# Patient Record
Sex: FEMALE | Race: WHITE | NOT HISPANIC OR LATINO | Employment: OTHER | URBAN - NONMETROPOLITAN AREA
[De-identification: names, ages, dates, MRNs, and addresses within clinical notes are randomized per-mention and may not be internally consistent; named-entity substitution may affect disease eponyms.]

---

## 2017-03-29 PROBLEM — I10 HYPERTENSION: Status: ACTIVE | Noted: 2017-03-29

## 2017-04-07 RX ORDER — QUINAPRIL 40 MG/1
80 TABLET ORAL DAILY
Qty: 180 TABLET | Refills: 3 | Status: SHIPPED | OUTPATIENT
Start: 2017-04-07 | End: 2018-01-04

## 2017-04-10 ENCOUNTER — OFFICE VISIT (OUTPATIENT)
Dept: INTERNAL MEDICINE | Facility: CLINIC | Age: 57
End: 2017-04-10

## 2017-04-10 VITALS
TEMPERATURE: 98.6 F | SYSTOLIC BLOOD PRESSURE: 115 MMHG | HEART RATE: 71 BPM | HEIGHT: 63 IN | WEIGHT: 146.5 LBS | OXYGEN SATURATION: 95 % | BODY MASS INDEX: 25.96 KG/M2 | DIASTOLIC BLOOD PRESSURE: 70 MMHG

## 2017-04-10 DIAGNOSIS — Z00.00 ROUTINE GENERAL MEDICAL EXAMINATION AT A HEALTH CARE FACILITY: ICD-10-CM

## 2017-04-10 DIAGNOSIS — I10 ESSENTIAL HYPERTENSION: Primary | ICD-10-CM

## 2017-04-10 DIAGNOSIS — Z12.11 SCREENING FOR COLON CANCER: ICD-10-CM

## 2017-04-10 PROCEDURE — 99396 PREV VISIT EST AGE 40-64: CPT | Performed by: INTERNAL MEDICINE

## 2017-04-10 RX ORDER — CETIRIZINE HYDROCHLORIDE 10 MG/1
10 TABLET ORAL DAILY
COMMUNITY

## 2017-04-10 NOTE — PROGRESS NOTES
Chief Complaint   Patient presents with   • Follow-up     1 year follow up       Evelyne Cantu is a 56 y.o. female and is here for a comprehensive physical exam. The patient reports problems - wt. gain.     History:  LMP: No LMP recorded.  Menopause at 46 years  Last pap date:   Abnormal pap? no  : 2  Para: 2    Do you take any herbs or supplements that were not prescribed by a doctor? no  Are you taking calcium supplements? yes  Are you taking aspirin daily? no      Health Habits:  Dental Exam. up to date  Eye Exam. up to date  Exercise: 1 times/week.  Current exercise activities include: weightlifting    Health Maintenance   Topic Date Due   • TDAP/TD VACCINES (1 - Tdap) 1979   • HEPATITIS C SCREENING  2017   • COLONOSCOPY  2017   • PAP SMEAR  2017   • INFLUENZA VACCINE  Completed       PMH, PSH, SocHx, FamHx, Allergies, and Medications: Reviewed and updated in the Visit Navigator.     No Known Allergies  Past Medical History:   Diagnosis Date   • Vitamin D deficiency      Past Surgical History:   Procedure Laterality Date   • ANKLE SURGERY     • HYSTERECTOMY       Social History     Social History   • Marital status:      Spouse name: N/A   • Number of children: N/A   • Years of education: N/A     Occupational History   • Not on file.     Social History Main Topics   • Smoking status: Never Smoker   • Smokeless tobacco: Not on file   • Alcohol use Not on file   • Drug use: Not on file   • Sexual activity: Not on file     Other Topics Concern   • Not on file     Social History Narrative   • No narrative on file     Family History   Problem Relation Age of Onset   • Arthritis Mother    • Cancer Mother    • Hypertension Mother    • Hypertension Father        Review of Systems  Review of Systems   Constitutional: Positive for fatigue. Negative for activity change, appetite change and fever.   HENT: Negative for congestion, ear discharge, ear pain and trouble swallowing.   "  Eyes: Negative for photophobia and visual disturbance.   Respiratory: Negative for cough and shortness of breath.    Cardiovascular: Negative for chest pain and palpitations.   Gastrointestinal: Negative for abdominal distention, abdominal pain, constipation, diarrhea, nausea and vomiting.   Endocrine: Negative.    Genitourinary: Negative for dysuria, hematuria and urgency.   Musculoskeletal: Positive for arthralgias. Negative for back pain, joint swelling and myalgias.   Skin: Negative for color change and rash.   Allergic/Immunologic: Negative.    Neurological: Negative for dizziness, weakness, light-headedness and headaches.   Hematological: Negative for adenopathy. Does not bruise/bleed easily.   Psychiatric/Behavioral: Negative for agitation, confusion and dysphoric mood. The patient is not nervous/anxious.        Vitals:    04/10/17 1616   BP: 115/70   Pulse: 71   Temp: 98.6 °F (37 °C)   SpO2: 95%       Objective   /70  Pulse 71  Temp 98.6 °F (37 °C)  Ht 63\" (160 cm)  Wt 146 lb 8 oz (66.5 kg)  SpO2 95%  BMI 25.95 kg/m2    Physical Exam   Constitutional: She is oriented to person, place, and time. She appears well-developed and well-nourished. No distress.   HENT:   Nose: Nose normal.   Mouth/Throat: Oropharynx is clear and moist.   Eyes: Conjunctivae and EOM are normal. No scleral icterus.   Neck: No tracheal deviation present. No thyromegaly present.   Cardiovascular: Normal rate and regular rhythm.  Exam reveals no friction rub.    No murmur heard.  Pulmonary/Chest: No respiratory distress. She has no wheezes. She has no rales.   Abdominal: Soft. She exhibits no distension and no mass. There is no tenderness. There is no guarding.   Musculoskeletal: Normal range of motion. She exhibits no deformity.   Lymphadenopathy:     She has no cervical adenopathy.   Neurological: She is alert and oriented to person, place, and time. She has normal reflexes. No cranial nerve deficit. Coordination normal. "   Skin: Skin is warm and dry. No rash noted. No erythema.   Psychiatric: She has a normal mood and affect. Her behavior is normal. Judgment and thought content normal.        Assessment/Plan   1. Healthy female exam.  2. Patient Counseling: Including but not Limited to the following, when appropriate:  --Nutrition: Stressed importance of moderation in sodium/caffeine intake, saturated fat and cholesterol, caloric balance, sufficient intake of fresh fruits, vegetables, fiber, calcium, iron,     --Discussed the issue of estrogen replacement, calcium supplement    --Exercise: Stressed the importance of regular exercise.     --Substance Abuse: Discussed cessation/primary prevention of tobacco, alcohol, or other drug use; driving or other dangerous activities under the influence; availability of treatment for abuse, as indicated based on social history.      --Sexuality: Discussed sexually transmitted diseases, partner selection, use of condoms, avoidance of unintended pregnancy  and contraceptive alternatives.     --Injury prevention: Discussed safety belts, safety helmets, smoke detector, smoking near bedding or upholstery.     --Dental health: Discussed importance of regular tooth brushing, flossing, and dental visits.  --Immunizations reviewed.  --Discussed benefits of colon cancer screening.      3. Discussed the patient's BMI with her.  The BMI is in the acceptable range  4. No Follow-up on file.  5. Age-appropriate Screening Scheduled  6. There are no Patient Instructions on file for this visit.    Assessment/Plan     Evelyne was seen today for follow-up.    Diagnoses and all orders for this visit:    Essential hypertension

## 2017-04-17 RX ORDER — AMLODIPINE BESYLATE 2.5 MG/1
2.5 TABLET ORAL DAILY
Qty: 90 TABLET | Refills: 3 | Status: SHIPPED | OUTPATIENT
Start: 2017-04-17 | End: 2018-01-12

## 2017-04-17 RX ORDER — AMLODIPINE BESYLATE 2.5 MG/1
2.5 TABLET ORAL DAILY
Qty: 90 TABLET | Refills: 3 | Status: SHIPPED | OUTPATIENT
Start: 2017-04-17 | End: 2017-04-17 | Stop reason: SDUPTHER

## 2017-04-18 RX ORDER — AMLODIPINE BESYLATE 5 MG/1
5 TABLET ORAL DAILY
Qty: 90 TABLET | Refills: 3 | Status: SHIPPED | OUTPATIENT
Start: 2017-04-18 | End: 2018-01-12

## 2017-05-08 ENCOUNTER — TRANSCRIBE ORDERS (OUTPATIENT)
Dept: ADMINISTRATIVE | Facility: HOSPITAL | Age: 57
End: 2017-05-08

## 2017-05-08 DIAGNOSIS — Z13.9 SCREENING: Primary | ICD-10-CM

## 2017-05-10 LAB
DEVELOPER EXPIRATION DATE: NORMAL
DEVELOPER LOT NUMBER: NORMAL
EXPIRATION DATE: NORMAL
FECAL OCCULT BLOOD SCREEN, POC: NEGATIVE
Lab: NORMAL
NEGATIVE CONTROL: NEGATIVE
POSITIVE CONTROL: POSITIVE

## 2017-05-10 PROCEDURE — 82270 OCCULT BLOOD FECES: CPT | Performed by: INTERNAL MEDICINE

## 2017-05-12 ENCOUNTER — TELEPHONE (OUTPATIENT)
Dept: INTERNAL MEDICINE | Facility: CLINIC | Age: 57
End: 2017-05-12

## 2017-05-16 ENCOUNTER — HOSPITAL ENCOUNTER (OUTPATIENT)
Dept: MAMMOGRAPHY | Facility: HOSPITAL | Age: 57
Discharge: HOME | End: 2017-05-16
Admitting: OBSTETRICS & GYNECOLOGY

## 2017-05-16 DIAGNOSIS — Z13.9 SCREENING: ICD-10-CM

## 2017-05-16 PROCEDURE — G0202 SCR MAMMO BI INCL CAD: HCPCS

## 2017-05-16 PROCEDURE — 77063 BREAST TOMOSYNTHESIS BI: CPT

## 2018-01-04 RX ORDER — QUINAPRIL 40 MG/1
80 TABLET ORAL DAILY
Qty: 180 TABLET | Refills: 0 | Status: SHIPPED | OUTPATIENT
Start: 2018-01-04 | End: 2018-04-09 | Stop reason: SDUPTHER

## 2018-01-12 RX ORDER — AMLODIPINE BESYLATE 5 MG/1
5 TABLET ORAL DAILY
Qty: 90 TABLET | Refills: 3 | Status: SHIPPED | OUTPATIENT
Start: 2018-01-12 | End: 2018-04-09 | Stop reason: SDUPTHER

## 2018-01-12 RX ORDER — AMLODIPINE BESYLATE 2.5 MG/1
2.5 TABLET ORAL DAILY
Qty: 90 TABLET | Refills: 3 | Status: SHIPPED | OUTPATIENT
Start: 2018-01-12 | End: 2018-04-09 | Stop reason: SDUPTHER

## 2018-04-09 ENCOUNTER — OFFICE VISIT (OUTPATIENT)
Dept: INTERNAL MEDICINE | Facility: CLINIC | Age: 58
End: 2018-04-09

## 2018-04-09 VITALS
OXYGEN SATURATION: 98 % | HEART RATE: 68 BPM | HEIGHT: 63 IN | DIASTOLIC BLOOD PRESSURE: 80 MMHG | BODY MASS INDEX: 24.98 KG/M2 | WEIGHT: 141 LBS | TEMPERATURE: 98.3 F | SYSTOLIC BLOOD PRESSURE: 120 MMHG

## 2018-04-09 DIAGNOSIS — Z00.00 ROUTINE GENERAL MEDICAL EXAMINATION AT A HEALTH CARE FACILITY: Primary | ICD-10-CM

## 2018-04-09 PROCEDURE — 99396 PREV VISIT EST AGE 40-64: CPT | Performed by: INTERNAL MEDICINE

## 2018-04-09 RX ORDER — QUINAPRIL 40 MG/1
80 TABLET ORAL DAILY
Qty: 180 TABLET | Refills: 3 | Status: SHIPPED | OUTPATIENT
Start: 2018-04-09 | End: 2019-04-11

## 2018-04-09 RX ORDER — AMLODIPINE BESYLATE 2.5 MG/1
2.5 TABLET ORAL DAILY
Qty: 90 TABLET | Refills: 3 | Status: SHIPPED | OUTPATIENT
Start: 2018-04-09 | End: 2019-04-11

## 2018-04-09 RX ORDER — AMLODIPINE BESYLATE 5 MG/1
5 TABLET ORAL DAILY
Qty: 90 TABLET | Refills: 3 | Status: SHIPPED | OUTPATIENT
Start: 2018-04-09 | End: 2019-04-19 | Stop reason: SDUPTHER

## 2018-04-09 NOTE — PROGRESS NOTES
Chief Complaint   Patient presents with   • Hypertension       Evelyne Cantu is a 57 y.o. female and is here for a comprehensive physical exam. The patient reports no problems   .     History:  LMP: No LMP recorded.  Menopause at 40 yrs  Last pap date: 40 yrs  Abnormal pap? no  : 2  Para: 2    Do you take any herbs or supplements that were not prescribed by a doctor? no  Are you taking calcium supplements? yes  Are you taking aspirin daily? no      Health Habits:  Dental Exam. up to date  Eye Exam. up to date  Exercise: 5 times/week.  Current exercise activities include: walking and yoga    Health Maintenance   Topic Date Due   • TDAP/TD VACCINES (1 - Tdap) 1979   • HEPATITIS C SCREENING  2017   • COLONOSCOPY  2017   • INFLUENZA VACCINE  2017   • COLON CANCER SCREENING ANNUAL FOBT  05/10/2018   • MAMMOGRAM  2019       PMH, PSH, SocHx, FamHx, Allergies, and Medications: Reviewed and updated in the Visit Navigator.     No Known Allergies  Past Medical History:   Diagnosis Date   • Vitamin D deficiency      Past Surgical History:   Procedure Laterality Date   • ANKLE SURGERY     • HYSTERECTOMY       Social History     Social History   • Marital status:      Spouse name: N/A   • Number of children: N/A   • Years of education: N/A     Occupational History   • Not on file.     Social History Main Topics   • Smoking status: Never Smoker   • Smokeless tobacco: Never Used   • Alcohol use 0.6 oz/week     1 Glasses of wine per week   • Drug use: No   • Sexual activity: Defer     Other Topics Concern   • Not on file     Social History Narrative   • No narrative on file     Family History   Problem Relation Age of Onset   • Arthritis Mother    • Cancer Mother    • Hypertension Mother    • Hypertension Father        Review of Systems  Review of Systems   Constitutional: Negative.  Negative for activity change, appetite change, fatigue and fever.   HENT: Negative for congestion, ear  "discharge, ear pain and trouble swallowing.         Vertigo   Eyes: Negative for photophobia and visual disturbance.   Respiratory: Negative for cough and shortness of breath.    Cardiovascular: Negative for chest pain and palpitations.   Gastrointestinal: Negative for abdominal distention, abdominal pain, constipation, diarrhea, nausea and vomiting.   Endocrine: Negative.    Genitourinary: Negative for dysuria, hematuria and urgency.   Musculoskeletal: Negative for arthralgias, back pain, joint swelling and myalgias.   Skin: Negative for color change and rash.   Allergic/Immunologic: Negative.    Neurological: Negative for dizziness, weakness, light-headedness and headaches.   Hematological: Negative for adenopathy. Does not bruise/bleed easily.   Psychiatric/Behavioral: Negative for agitation, confusion and dysphoric mood. The patient is not nervous/anxious.        Vitals:    04/09/18 1524   BP: 120/80   Pulse: 68   Temp: 98.3 °F (36.8 °C)   SpO2: 98%       Objective   /80   Pulse 68   Temp 98.3 °F (36.8 °C)   Ht 160 cm (63\")   Wt 64 kg (141 lb)   SpO2 98%   BMI 24.98 kg/m²     Physical Exam   Constitutional: She is oriented to person, place, and time. She appears well-developed and well-nourished. No distress.   HENT:   Nose: Nose normal.   Mouth/Throat: Oropharynx is clear and moist.   Eyes: Conjunctivae and EOM are normal. No scleral icterus.   Neck: No tracheal deviation present. No thyromegaly present.   Cardiovascular: Normal rate and regular rhythm.  Exam reveals no friction rub.    No murmur heard.  Pulmonary/Chest: No respiratory distress. She has no wheezes. She has no rales.   Abdominal: Soft. She exhibits no distension and no mass. There is no tenderness. There is no guarding.   Musculoskeletal: Normal range of motion. She exhibits no deformity.   Lymphadenopathy:     She has no cervical adenopathy.   Neurological: She is alert and oriented to person, place, and time. She has normal " reflexes. No cranial nerve deficit. Coordination normal.   Skin: Skin is warm and dry. No rash noted. No erythema.   Psychiatric: She has a normal mood and affect. Her behavior is normal. Judgment and thought content normal.        Assessment/Plan   1. Healthy female exam.   2. Patient Counseling: Including but not Limited to the following, when appropriate:  --Nutrition: Stressed importance of moderation in sodium/caffeine intake, saturated fat and cholesterol, caloric balance, sufficient intake of fresh fruits, vegetables, fiber, calcium  --Discussed the issue of  calcium supplement  --Exercise: Stressed the importance of regular exercise.   --Substance Abuse: Discussed cessation/primary prevention of tobacco, alcohol, or other drug use; driving or other dangerous activities under the influence; availability of treatment for abuse, as indicated based on social history.    --Sexuality: Discussed sexually transmitted diseases, partner selection, use of condoms, avoidance of unintended pregnancy  and contraceptive alternatives.   --Injury prevention: Discussed safety belts, safety helmets, smoke detector, smoking near bedding or upholstery.   --Dental health: Discussed importance of regular tooth brushing, flossing, and dental visits.  --Immunizations reviewed.  --Discussed benefits of colon cancer screening.      3. Discussed the patient's BMI with her.  The BMI is in the acceptable range  4. No Follow-up on file.  5. Age-appropriate Screening Scheduled  6. There are no Patient Instructions on file for this visit.    Assessment/Plan     Evelyne was seen today for hypertension.    Diagnoses and all orders for this visit:    Routine general medical examination at a health care facility    Other orders  -     amLODIPine (NORVASC) 2.5 MG tablet; TAKE 1 TABLET BY MOUTH ONCE DAILY  -     amLODIPine (NORVASC) 5 MG tablet; Take 1 tablet by mouth Daily.  -     quinapril (ACCUPRIL) 40 MG tablet; TAKE 2 TABLETS BY MOUTH ONCE  DAILY  -     conjugated estrogens (PREMARIN) 0.625 MG/GM vaginal cream; Insert 1 applicatorful intravaginally as directed

## 2018-04-11 ENCOUNTER — TRANSCRIBE ORDERS (OUTPATIENT)
Dept: ADMINISTRATIVE | Facility: HOSPITAL | Age: 58
End: 2018-04-11

## 2018-04-11 DIAGNOSIS — Z12.39 SCREENING BREAST EXAMINATION: Primary | ICD-10-CM

## 2018-05-21 ENCOUNTER — APPOINTMENT (OUTPATIENT)
Dept: MAMMOGRAPHY | Facility: HOSPITAL | Age: 58
End: 2018-05-21

## 2018-06-26 ENCOUNTER — HOSPITAL ENCOUNTER (OUTPATIENT)
Dept: MAMMOGRAPHY | Facility: HOSPITAL | Age: 58
Discharge: HOME | End: 2018-06-26
Admitting: INTERNAL MEDICINE

## 2018-06-26 DIAGNOSIS — Z12.39 SCREENING BREAST EXAMINATION: ICD-10-CM

## 2018-06-26 PROCEDURE — 77063 BREAST TOMOSYNTHESIS BI: CPT

## 2018-06-26 PROCEDURE — 77067 SCR MAMMO BI INCL CAD: CPT

## 2018-08-01 ENCOUNTER — OFFICE VISIT (OUTPATIENT)
Dept: INTERNAL MEDICINE | Facility: CLINIC | Age: 58
End: 2018-08-01

## 2018-08-01 VITALS
HEART RATE: 60 BPM | TEMPERATURE: 97.6 F | SYSTOLIC BLOOD PRESSURE: 119 MMHG | DIASTOLIC BLOOD PRESSURE: 68 MMHG | WEIGHT: 145 LBS | HEIGHT: 63 IN | BODY MASS INDEX: 25.69 KG/M2 | OXYGEN SATURATION: 99 %

## 2018-08-01 DIAGNOSIS — K11.5 STONE OF SALIVARY GLAND OR DUCT: Primary | ICD-10-CM

## 2018-08-01 PROCEDURE — 99213 OFFICE O/P EST LOW 20 MIN: CPT | Performed by: PHYSICIAN ASSISTANT

## 2018-08-01 NOTE — PROGRESS NOTES
Subjective     Chief Complaint: jaw swelling    History of Present Illness     Evelyne Cantu is a 58 y.o. female presenting with complaints of 2 days swelling to her left jaw.  Patient states she took ibuprofen, massage the area, and the swelling somewhat improved.  Denies any symptoms of infection, no sore throat, ear pain, fever, cough.  Denies any history of TMJ. No current dental issues.    The following portions of the patient's history were reviewed and updated as appropriate: current medications, allergies, PMH.    Review of Systems   Constitutional: Negative for appetite change, chills, fatigue, fever and unexpected weight change.   HENT: Positive for facial swelling. Negative for congestion, ear pain, hearing loss, nosebleeds, sinus pressure, sore throat, tinnitus and trouble swallowing.    Eyes: Negative for pain, discharge, redness, itching and visual disturbance.   Respiratory: Negative for cough, chest tightness, shortness of breath and wheezing.    Cardiovascular: Negative for chest pain, palpitations and leg swelling.   Gastrointestinal: Negative for abdominal pain, blood in stool, constipation, diarrhea, nausea and vomiting.   Endocrine: Negative for cold intolerance, heat intolerance, polydipsia, polyphagia and polyuria.   Genitourinary: Negative for decreased urine volume, dysuria, flank pain, frequency and hematuria.   Musculoskeletal: Negative for arthralgias, back pain, gait problem, joint swelling, myalgias, neck pain and neck stiffness.   Skin: Negative for color change and rash.   Allergic/Immunologic: Negative for environmental allergies, food allergies and immunocompromised state.   Neurological: Negative for dizziness, syncope, weakness, light-headedness and headaches.   Hematological: Negative for adenopathy. Does not bruise/bleed easily.   Psychiatric/Behavioral: Negative for dysphoric mood, sleep disturbance and suicidal ideas. The patient is not nervous/anxious.      Objective  "    Vitals:    08/01/18 0813   BP: 119/68   Pulse: 60   Temp: 97.6 °F (36.4 °C)   SpO2: 99%   Weight: 65.8 kg (145 lb)   Height: 160 cm (62.99\")     Physical Exam   Constitutional: She appears well-developed and well-nourished.   HENT:   Head:       Right Ear: Tympanic membrane normal.   Left Ear: Tympanic membrane normal.   Nose: Nose normal.   Mouth/Throat: Oropharynx is clear and moist.   Slight swelling to left jaw. No erythema. No lymphadenopathy.   Eyes: Pupils are equal, round, and reactive to light. Conjunctivae and EOM are normal.   Cardiovascular: Normal rate and regular rhythm.    Pulmonary/Chest: Effort normal and breath sounds normal.   Lymphadenopathy:     She has no cervical adenopathy.   Psychiatric: She has a normal mood and affect.     Assessment/Plan     Diagnoses and all orders for this visit:    Stone of salivary gland or duct    Improving.  She will continue with anti-inflammatory medications, sour candies, massage.      Bernarda Auguste PA-C  08/01/2018         Please note that portions of this note were completed with a voice recognition program. Efforts were made to edit dictation, but occasionally words are mistranscribed.    "

## 2018-08-27 ENCOUNTER — TELEPHONE (OUTPATIENT)
Dept: INTERNAL MEDICINE | Facility: CLINIC | Age: 58
End: 2018-08-27

## 2018-08-27 DIAGNOSIS — Z23 NEED FOR VACCINATION: Primary | ICD-10-CM

## 2019-04-11 RX ORDER — QUINAPRIL 40 MG/1
80 TABLET ORAL DAILY
Qty: 180 TABLET | Refills: 0 | Status: SHIPPED | OUTPATIENT
Start: 2019-04-11 | End: 2019-04-19 | Stop reason: SDUPTHER

## 2019-04-11 RX ORDER — AMLODIPINE BESYLATE 2.5 MG/1
2.5 TABLET ORAL DAILY
Qty: 90 TABLET | Refills: 0 | Status: SHIPPED | OUTPATIENT
Start: 2019-04-11 | End: 2019-04-19 | Stop reason: SDUPTHER

## 2019-04-19 ENCOUNTER — OFFICE VISIT (OUTPATIENT)
Dept: INTERNAL MEDICINE | Facility: CLINIC | Age: 59
End: 2019-04-19

## 2019-04-19 VITALS
HEIGHT: 63 IN | DIASTOLIC BLOOD PRESSURE: 87 MMHG | BODY MASS INDEX: 25.52 KG/M2 | HEART RATE: 69 BPM | WEIGHT: 144 LBS | SYSTOLIC BLOOD PRESSURE: 155 MMHG | TEMPERATURE: 98.9 F | OXYGEN SATURATION: 98 %

## 2019-04-19 DIAGNOSIS — Z00.00 ROUTINE GENERAL MEDICAL EXAMINATION AT A HEALTH CARE FACILITY: Primary | ICD-10-CM

## 2019-04-19 PROCEDURE — 99396 PREV VISIT EST AGE 40-64: CPT | Performed by: INTERNAL MEDICINE

## 2019-04-19 RX ORDER — QUINAPRIL 40 MG/1
80 TABLET ORAL DAILY
Qty: 180 TABLET | Refills: 0 | Status: SHIPPED | OUTPATIENT
Start: 2019-04-19 | End: 2019-09-09 | Stop reason: SDUPTHER

## 2019-04-19 RX ORDER — AMLODIPINE BESYLATE 5 MG/1
5 TABLET ORAL DAILY
Qty: 90 TABLET | Refills: 3 | Status: SHIPPED | OUTPATIENT
Start: 2019-04-19 | End: 2020-03-26 | Stop reason: SDUPTHER

## 2019-04-19 RX ORDER — AMLODIPINE BESYLATE 2.5 MG/1
2.5 TABLET ORAL DAILY
Qty: 90 TABLET | Refills: 3 | Status: SHIPPED | OUTPATIENT
Start: 2019-04-19 | End: 2020-06-05

## 2019-04-19 NOTE — PROGRESS NOTES
Chief Complaint   Patient presents with   • Hypertension       Evelyne Cantu is a 58 y.o. female and is here for a comprehensive physical exam. The patient reports no problems   .     History:  LMP: No LMP recorded (lmp unknown). Patient has had a hysterectomy.  Menopause at 40 yrs  Last pap date: 40 yrs  Abnormal pap? no  : 2  Para: 2    Do you take any herbs or supplements that were not prescribed by a doctor? no  Are you taking calcium supplements? yes  Are you taking aspirin daily? no      Health Habits:  Dental Exam. up to date  Eye Exam. up to date  Exercise: 5 times/week.  Current exercise activities include: walking and yoga    Health Maintenance   Topic Date Due   • TDAP/TD VACCINES (1 - Tdap) 1979   • ZOSTER VACCINE (1 of 2) 2010   • HEPATITIS C SCREENING  2017   • FECAL OCCULT BLOOD TEST  05/10/2018   • ANNUAL PHYSICAL  04/10/2019   • INFLUENZA VACCINE  2019   • MAMMOGRAM  2020       PMH, PSH, SocHx, FamHx, Allergies, and Medications: Reviewed and updated in the Visit Navigator.     No Known Allergies  Past Medical History:   Diagnosis Date   • Vitamin D deficiency      Past Surgical History:   Procedure Laterality Date   • ANKLE SURGERY     • HYSTERECTOMY       Social History     Socioeconomic History   • Marital status:      Spouse name: Not on file   • Number of children: Not on file   • Years of education: Not on file   • Highest education level: Not on file   Tobacco Use   • Smoking status: Never Smoker   • Smokeless tobacco: Never Used   Substance and Sexual Activity   • Alcohol use: Yes     Alcohol/week: 0.6 oz     Types: 1 Glasses of wine per week   • Drug use: No   • Sexual activity: Defer     Family History   Problem Relation Age of Onset   • Arthritis Mother    • Cancer Mother    • Hypertension Mother    • Hypertension Father        Review of Systems  Review of Systems   Constitutional: Negative.  Negative for activity change, appetite change,  "fatigue and fever.   HENT: Negative for congestion, ear discharge, ear pain and trouble swallowing.         Vertigo   Eyes: Negative for photophobia and visual disturbance.   Respiratory: Negative for cough and shortness of breath.    Cardiovascular: Negative for chest pain and palpitations.   Gastrointestinal: Negative for abdominal distention, abdominal pain, constipation, diarrhea, nausea and vomiting.   Endocrine: Negative.    Genitourinary: Negative for dysuria, hematuria and urgency.   Musculoskeletal: Negative for arthralgias, back pain, joint swelling and myalgias.   Skin: Negative for color change and rash.   Allergic/Immunologic: Negative.    Neurological: Negative for dizziness, weakness, light-headedness and headaches.   Hematological: Negative for adenopathy. Does not bruise/bleed easily.   Psychiatric/Behavioral: Negative for agitation, confusion and dysphoric mood. The patient is not nervous/anxious.        Vitals:    04/19/19 1514   BP: 155/87   Pulse: 69   Temp: 98.9 °F (37.2 °C)   SpO2: 98%       Objective   /87 Comment: Automatic  Pulse 69   Temp 98.9 °F (37.2 °C) (Temporal)   Ht 160 cm (62.99\")   Wt 65.3 kg (144 lb)   LMP  (LMP Unknown)   SpO2 98%   Breastfeeding? No   BMI 25.52 kg/m²     Physical Exam   Constitutional: She is oriented to person, place, and time. She appears well-developed and well-nourished. No distress.   HENT:   Nose: Nose normal.   Mouth/Throat: Oropharynx is clear and moist.   Eyes: Conjunctivae and EOM are normal. No scleral icterus.   Neck: No tracheal deviation present. No thyromegaly present.   Cardiovascular: Normal rate and regular rhythm. Exam reveals no friction rub.   No murmur heard.  Pulmonary/Chest: No respiratory distress. She has no wheezes. She has no rales.   Abdominal: Soft. She exhibits no distension and no mass. There is no tenderness. There is no guarding.   Musculoskeletal: Normal range of motion. She exhibits no deformity. "   Lymphadenopathy:     She has no cervical adenopathy.   Neurological: She is alert and oriented to person, place, and time. She has normal reflexes. No cranial nerve deficit. Coordination normal.   Skin: Skin is warm and dry. No rash noted. No erythema.   Psychiatric: She has a normal mood and affect. Her behavior is normal. Judgment and thought content normal.        Assessment/Plan   1. Healthy female exam.   2. Patient Counseling: Including but not Limited to the following, when appropriate:  --Nutrition: Stressed importance of moderation in sodium/caffeine intake, saturated fat and cholesterol, caloric balance, sufficient intake of fresh fruits, vegetables, fiber, calcium  --Discussed the issue of  calcium supplement  --Exercise: Stressed the importance of regular exercise.   --Substance Abuse: Discussed cessation/primary prevention of tobacco, alcohol, or other drug use; driving or other dangerous activities under the influence; availability of treatment for abuse, as indicated based on social history.    --Sexuality: Discussed sexually transmitted diseases, partner selection, use of condoms, avoidance of unintended pregnancy  and contraceptive alternatives.   --Injury prevention: Discussed safety belts, safety helmets, smoke detector, smoking near bedding or upholstery.   --Dental health: Discussed importance of regular tooth brushing, flossing, and dental visits.  --Immunizations reviewed.  --Discussed benefits of colon cancer screening.david      3. Discussed the patient's BMI with her.  The BMI is in the acceptable range  4. No Follow-up on file.  5. Age-appropriate Screening Scheduled  6. There are no Patient Instructions on file for this visit.    Assessment/Plan     Evelyne was seen today for hypertension.    Diagnoses and all orders for this visit:    Routine general medical examination at a health care facility    Other orders  -     amLODIPine (NORVASC) 2.5 MG tablet; TAKE 1 TABLET BY MOUTH ONCE  DAILY  -     amLODIPine (NORVASC) 5 MG tablet; Take 1 tablet by mouth Daily.  -     quinapril (ACCUPRIL) 40 MG tablet; TAKE 2 TABLETS BY MOUTH ONCE DAILY  -     conjugated estrogens (PREMARIN) 0.625 MG/GM vaginal cream; Insert 1 applicatorful intravaginally as directed    Advised to follow BP readings at home add on HCTZ if not better within 2 weeks

## 2019-06-10 ENCOUNTER — TRANSCRIBE ORDERS (OUTPATIENT)
Dept: ADMINISTRATIVE | Facility: HOSPITAL | Age: 59
End: 2019-06-10

## 2019-06-10 DIAGNOSIS — Z12.39 SCREENING BREAST EXAMINATION: Primary | ICD-10-CM

## 2019-08-12 ENCOUNTER — HOSPITAL ENCOUNTER (OUTPATIENT)
Dept: MAMMOGRAPHY | Facility: HOSPITAL | Age: 59
Discharge: HOME | End: 2019-08-12
Admitting: INTERNAL MEDICINE

## 2019-08-12 DIAGNOSIS — Z12.39 SCREENING BREAST EXAMINATION: ICD-10-CM

## 2019-08-12 PROCEDURE — 77067 SCR MAMMO BI INCL CAD: CPT

## 2019-08-12 PROCEDURE — 77063 BREAST TOMOSYNTHESIS BI: CPT

## 2019-09-06 ENCOUNTER — RESULTS ENCOUNTER (OUTPATIENT)
Dept: INTERNAL MEDICINE | Facility: CLINIC | Age: 59
End: 2019-09-06

## 2019-09-06 DIAGNOSIS — Z12.11 SCREEN FOR COLON CANCER: Primary | ICD-10-CM

## 2019-09-06 DIAGNOSIS — Z12.11 SCREEN FOR COLON CANCER: ICD-10-CM

## 2019-09-09 RX ORDER — QUINAPRIL 40 MG/1
80 TABLET ORAL DAILY
Qty: 180 TABLET | Refills: 1 | Status: SHIPPED | OUTPATIENT
Start: 2019-09-09 | End: 2019-12-23

## 2019-12-23 RX ORDER — QUINAPRIL 40 MG/1
80 TABLET ORAL DAILY
Qty: 180 TABLET | Refills: 1 | Status: SHIPPED | OUTPATIENT
Start: 2019-12-23 | End: 2020-06-05 | Stop reason: SDUPTHER

## 2020-03-26 RX ORDER — AMLODIPINE BESYLATE 5 MG/1
5 TABLET ORAL DAILY
Qty: 90 TABLET | Refills: 1 | Status: SHIPPED | OUTPATIENT
Start: 2020-03-26 | End: 2020-06-05 | Stop reason: SDUPTHER

## 2020-05-05 RX ORDER — AMLODIPINE BESYLATE 2.5 MG/1
2.5 TABLET ORAL DAILY
Qty: 90 TABLET | Refills: 3 | OUTPATIENT
Start: 2020-05-05

## 2020-06-05 ENCOUNTER — TELEPHONE (OUTPATIENT)
Dept: INTERNAL MEDICINE | Facility: CLINIC | Age: 60
End: 2020-06-05

## 2020-06-05 ENCOUNTER — OFFICE VISIT (OUTPATIENT)
Dept: INTERNAL MEDICINE | Facility: CLINIC | Age: 60
End: 2020-06-05

## 2020-06-05 VITALS
OXYGEN SATURATION: 99 % | HEART RATE: 59 BPM | DIASTOLIC BLOOD PRESSURE: 88 MMHG | SYSTOLIC BLOOD PRESSURE: 168 MMHG | HEIGHT: 63 IN | WEIGHT: 143.4 LBS | BODY MASS INDEX: 25.41 KG/M2 | TEMPERATURE: 96.9 F

## 2020-06-05 DIAGNOSIS — I10 ESSENTIAL HYPERTENSION: Primary | ICD-10-CM

## 2020-06-05 PROCEDURE — 99213 OFFICE O/P EST LOW 20 MIN: CPT | Performed by: INTERNAL MEDICINE

## 2020-06-05 RX ORDER — HYDROCHLOROTHIAZIDE 12.5 MG/1
12.5 TABLET ORAL DAILY
Qty: 90 TABLET | Refills: 3 | Status: SHIPPED | OUTPATIENT
Start: 2020-06-05 | End: 2020-12-17 | Stop reason: SDUPTHER

## 2020-06-05 RX ORDER — AMLODIPINE BESYLATE 2.5 MG/1
2.5 TABLET ORAL DAILY
Qty: 90 TABLET | Refills: 3 | Status: CANCELLED | OUTPATIENT
Start: 2020-06-05

## 2020-06-05 RX ORDER — AMLODIPINE BESYLATE 10 MG/1
10 TABLET ORAL DAILY
Qty: 90 TABLET | Refills: 3 | Status: SHIPPED | OUTPATIENT
Start: 2020-06-05 | End: 2020-06-05 | Stop reason: SDUPTHER

## 2020-06-05 RX ORDER — AMLODIPINE BESYLATE 10 MG/1
10 TABLET ORAL DAILY
Qty: 90 TABLET | Refills: 3 | Status: SHIPPED | OUTPATIENT
Start: 2020-06-05 | End: 2020-12-17 | Stop reason: SDUPTHER

## 2020-06-05 RX ORDER — QUINAPRIL 40 MG/1
40 TABLET ORAL DAILY
Qty: 90 TABLET | Refills: 1 | Status: SHIPPED | OUTPATIENT
Start: 2020-06-05 | End: 2020-12-17 | Stop reason: SDUPTHER

## 2020-06-05 RX ORDER — AMLODIPINE BESYLATE 5 MG/1
5 TABLET ORAL DAILY
Qty: 90 TABLET | Refills: 3 | Status: CANCELLED | OUTPATIENT
Start: 2020-06-05

## 2020-06-05 NOTE — PROGRESS NOTES
Chief Complaint   Patient presents with   • Hypertension       Evelyne Cantu is a 60 y.o. female and is here for a comprehensive physical exam. The patient reports problems - htn.     History:  LMP: No LMP recorded (lmp unknown). Patient has had a hysterectomy.  Menopause at 50 years  Last pap date:  Abnormal pap? no  : 2  Para: 2    Do you take any herbs or supplements that were not prescribed by a doctor? no  Are you taking calcium supplements? no  Are you taking aspirin daily? no      Health Habits:  Dental Exam. unknown  Eye Exam. up to date  Exercise: 5 times/week.  Current exercise activities include: walking    Health Maintenance   Topic Date Due   • TDAP/TD VACCINES (1 - Tdap) 1971   • HEPATITIS C SCREENING  2017   • FECAL OCCULT BLOOD TEST  05/10/2018   • ZOSTER VACCINE (2 of 2) 2019   • ANNUAL PHYSICAL  2020   • INFLUENZA VACCINE  2020   • MAMMOGRAM  2021       PMH, PSH, SocHx, FamHx, Allergies, and Medications: Reviewed and updated in the Visit Navigator.     No Known Allergies  Past Medical History:   Diagnosis Date   • Vitamin D deficiency      Past Surgical History:   Procedure Laterality Date   • ANKLE SURGERY     • BREAST BIOPSY     • HYSTERECTOMY       Social History     Socioeconomic History   • Marital status:      Spouse name: Not on file   • Number of children: Not on file   • Years of education: Not on file   • Highest education level: Not on file   Tobacco Use   • Smoking status: Never Smoker   • Smokeless tobacco: Never Used   Substance and Sexual Activity   • Alcohol use: Yes     Alcohol/week: 1.0 standard drinks     Types: 1 Glasses of wine per week   • Drug use: No   • Sexual activity: Defer     Family History   Problem Relation Age of Onset   • Arthritis Mother    • Cancer Mother    • Hypertension Mother    • Hypertension Father    • Breast cancer Maternal Aunt    • Breast cancer Paternal Aunt        Review of Systems  Review of  "Systems   Constitutional: Negative.  Negative for activity change, appetite change, fatigue and fever.   HENT: Negative for congestion, ear discharge, ear pain and trouble swallowing.    Eyes: Negative for photophobia and visual disturbance.   Respiratory: Negative for cough and shortness of breath.    Cardiovascular: Negative for chest pain and palpitations.   Gastrointestinal: Negative for abdominal distention, abdominal pain, constipation, diarrhea, nausea and vomiting.   Endocrine: Negative.    Genitourinary: Negative for dysuria, hematuria and urgency.   Musculoskeletal: Negative for arthralgias, back pain, joint swelling, myalgias and neck pain.   Skin: Negative for color change and rash.   Allergic/Immunologic: Negative.    Neurological: Negative for dizziness, weakness, light-headedness and headaches.   Hematological: Negative for adenopathy. Does not bruise/bleed easily.   Psychiatric/Behavioral: Negative for agitation, confusion and dysphoric mood. The patient is not nervous/anxious.        Vitals:    06/05/20 1059   BP: 168/88   Pulse: 59   Temp: 96.9 °F (36.1 °C)   SpO2: 99%       Objective   /88   Pulse 59   Temp 96.9 °F (36.1 °C) (Temporal)   Ht 160 cm (63\")   Wt 65 kg (143 lb 6.4 oz)   LMP  (LMP Unknown)   SpO2 99%   BMI 25.40 kg/m²     Physical Exam   Constitutional: She is oriented to person, place, and time. She appears well-developed and well-nourished. No distress.   HENT:   Nose: Nose normal.   Mouth/Throat: Oropharynx is clear and moist.   Eyes: Conjunctivae and EOM are normal. No scleral icterus.   Neck: No tracheal deviation present. No thyromegaly present.   Cardiovascular: Normal rate and regular rhythm. Exam reveals no friction rub.   No murmur heard.  Pulmonary/Chest: No respiratory distress. She has no wheezes. She has no rales.   Abdominal: Soft. She exhibits no distension and no mass. There is no tenderness. There is no guarding.   Musculoskeletal: Normal range of motion. " She exhibits no deformity.   Lymphadenopathy:     She has no cervical adenopathy.   Neurological: She is alert and oriented to person, place, and time. She has normal reflexes. No cranial nerve deficit. Coordination normal.   Skin: Skin is warm and dry. No rash noted. No erythema.   Psychiatric: She has a normal mood and affect. Her behavior is normal. Judgment and thought content normal.       The CVD Risk score (BLANQUITA'Agostino, et al., 2008) failed to calculate for the following reasons:    Cannot find a previous HDL lab    Cannot find a previous total cholesterol lab    Lab Results   Component Value Date     (H) 03/16/2015     Glucose   Date Value Ref Range Status   03/28/2016 78 70 - 100 mg/dL Final     BUN   Date Value Ref Range Status   03/31/2016 11 9 - 23 mg/dL Final     Creatinine   Date Value Ref Range Status   03/31/2016 0.60 0.60 - 1.30 mg/dL Final     Sodium   Date Value Ref Range Status   03/31/2016 143 132 - 146 mmol/L Final     Potassium   Date Value Ref Range Status   03/31/2016 4.1 3.5 - 5.5 mmol/L Final     Chloride   Date Value Ref Range Status   03/31/2016 106 99 - 109 mmol/L Final     Total CO2   Date Value Ref Range Status   03/31/2016 33 (H) 20 - 31 mmol/L Final     Calcium   Date Value Ref Range Status   03/31/2016 9.3 8.7 - 10.4 mg/dL Final     Total Protein   Date Value Ref Range Status   03/28/2016 7.7 5.7 - 8.2 g/dL Final     Albumin   Date Value Ref Range Status   03/31/2016 4.5 3.2 - 4.8 g/dL Final     ALT (SGPT)   Date Value Ref Range Status   03/31/2016 13 7 - 40 U/L Final     AST (SGOT)   Date Value Ref Range Status   03/31/2016 23 0 - 33 U/L Final     Alkaline Phosphatase   Date Value Ref Range Status   03/31/2016 82 25 - 100 U/L Final     Total Bilirubin   Date Value Ref Range Status   03/31/2016 0.5 0.3 - 1.2 mg/dL Final     eGFR Non  Am   Date Value Ref Range Status   03/31/2016 114 mL/min/1.732 Final     Comment:                                      --------------------------------------                                  [employer] Guidelines                                                                       .                                  Stage      Description           GFR                                  1          Normal or High        90+                                  2          Mild decrease         60-89                                  3          Moderate decrease     30-59                                  4          Severe decrease       15-29                                  5          Kidney failure        <15       A/G Ratio   Date Value Ref Range Status   03/28/2016 CANCELED       Comment:     Result canceled by the ancillary     BUN/Creatinine Ratio   Date Value Ref Range Status   03/28/2016 CANCELED       Comment:     Result canceled by the ancillary     Anion Gap   Date Value Ref Range Status   03/28/2016 4 3 - 11 mmol/L Final     No results found for: WBC, HGB, HCT, MCV, PLT     Assessment/Plan   1. Healthy female exam. Answers for HPI/ROS submitted by the patient on 5/29/2020   Hypertension  What is the primary reason for your visit?: High Blood Pressure  Chronicity: chronic  Onset: more than 1 year ago  Progression since onset: gradually worsening  Condition status: controlled  anxiety: No  blurred vision: No  malaise/fatigue: No  orthopnea: No  peripheral edema: Yes  PND: No  sweats: No  Agents associated with hypertension: no associated agents  CAD risks: post-menopausal state  Compliance problems: exercise     2. Patient Counseling: Including but not Limited to the following, when appropriate:  --Nutrition: Stressed importance of moderation in sodium/caffeine intake, saturated fat and cholesterol, caloric balance, sufficient intake of fresh fruits, vegetables, fiber, calcium, iron, and 1 mg of folate supplement per day (for females capable of pregnancy).  --Discussed the issue of estrogen replacement, calcium  supplement, and the daily use of baby aspirin.  --Exercise: Stressed the importance of regular exercise.   --Substance Abuse: Discussed cessation/primary prevention of tobacco, alcohol, or other drug use; driving or other dangerous activities under the influence; availability of treatment for abuse, as indicated based on social history.    --Sexuality: Discussed sexually transmitted diseases, partner selection, use of condoms, avoidance of unintended pregnancy  and contraceptive alternatives.   --Injury prevention: Discussed safety belts, safety helmets, smoke detector, smoking near bedding or upholstery.   --Dental health: Discussed importance of regular tooth brushing, flossing, and dental visits.  --Immunizations reviewed.  --Discussed benefits of colon cancer screening.      3. Discussed the patient's BMI with her.  The BMI is above average; BMI management plan is completed  4. No follow-ups on file.  5. Age-appropriate Screening Scheduled  6. There are no Patient Instructions on file for this visit.    Assessment/Plan     Evelyne was seen today for hypertension.    Diagnoses and all orders for this visit:    Essential hypertension needs better BP control.  Has been compliant with dietary restrictions and exercise program.  Amlodipine dose has been increased to 10 mg daily.  Added on HCTZ 12.5.  Continue with Accupril at 40 mg daily only

## 2020-10-15 ENCOUNTER — TRANSCRIBE ORDERS (OUTPATIENT)
Dept: ADMINISTRATIVE | Facility: HOSPITAL | Age: 60
End: 2020-10-15

## 2020-10-15 DIAGNOSIS — Z12.31 SCREENING MAMMOGRAM, ENCOUNTER FOR: Primary | ICD-10-CM

## 2020-12-03 ENCOUNTER — HOSPITAL ENCOUNTER (OUTPATIENT)
Dept: MAMMOGRAPHY | Facility: HOSPITAL | Age: 60
Discharge: HOME | End: 2020-12-03
Admitting: INTERNAL MEDICINE

## 2020-12-03 DIAGNOSIS — Z12.31 SCREENING MAMMOGRAM, ENCOUNTER FOR: ICD-10-CM

## 2020-12-03 PROCEDURE — 77067 SCR MAMMO BI INCL CAD: CPT

## 2020-12-03 PROCEDURE — 77063 BREAST TOMOSYNTHESIS BI: CPT

## 2020-12-17 ENCOUNTER — OFFICE VISIT (OUTPATIENT)
Dept: INTERNAL MEDICINE | Facility: CLINIC | Age: 60
End: 2020-12-17

## 2020-12-17 VITALS
DIASTOLIC BLOOD PRESSURE: 70 MMHG | WEIGHT: 147 LBS | HEART RATE: 87 BPM | TEMPERATURE: 96.6 F | SYSTOLIC BLOOD PRESSURE: 130 MMHG | HEIGHT: 63 IN | OXYGEN SATURATION: 97 % | BODY MASS INDEX: 26.05 KG/M2

## 2020-12-17 DIAGNOSIS — Z00.00 ROUTINE GENERAL MEDICAL EXAMINATION AT A HEALTH CARE FACILITY: Primary | ICD-10-CM

## 2020-12-17 DIAGNOSIS — I10 ESSENTIAL HYPERTENSION: ICD-10-CM

## 2020-12-17 LAB — TSH SERPL DL<=0.005 MIU/L-ACNC: 2.15 UIU/ML (ref 0.27–4.2)

## 2020-12-17 PROCEDURE — 99396 PREV VISIT EST AGE 40-64: CPT | Performed by: INTERNAL MEDICINE

## 2020-12-17 RX ORDER — AMLODIPINE BESYLATE 10 MG/1
10 TABLET ORAL DAILY
Qty: 90 TABLET | Refills: 3 | Status: SHIPPED | OUTPATIENT
Start: 2020-12-17 | End: 2021-12-13 | Stop reason: SDUPTHER

## 2020-12-17 RX ORDER — HYDROCHLOROTHIAZIDE 12.5 MG/1
12.5 TABLET ORAL DAILY
Qty: 90 TABLET | Refills: 3 | Status: SHIPPED | OUTPATIENT
Start: 2020-12-17 | End: 2021-12-13 | Stop reason: SDUPTHER

## 2020-12-17 RX ORDER — QUINAPRIL 40 MG/1
40 TABLET ORAL DAILY
Qty: 90 TABLET | Refills: 3 | Status: SHIPPED | OUTPATIENT
Start: 2020-12-17 | End: 2021-12-13 | Stop reason: SDUPTHER

## 2020-12-17 NOTE — PROGRESS NOTES
Chief Complaint   Patient presents with   • Annual Exam       Evelyne Cantu is a 60 y.o. female and is here for a comprehensive physical exam. The patient reports no problems.     History:  LMP: No LMP recorded (lmp unknown). Patient has had a hysterectomy.  Menopause at 48 years  Last pap date:   Abnormal pap? no  : 2  Para: 2    Do you take any herbs or supplements that were not prescribed by a doctor? yes  Are you taking calcium supplements? no  Are you taking aspirin daily? no      Health Habits:  Dental Exam. up to date  Eye Exam. up to date  Exercise: 3 times/week.  Current exercise activities include: walking    Health Maintenance   Topic Date Due   • TDAP/TD VACCINES (1 - Tdap) 1979   • HEPATITIS C SCREENING  2017   • ANNUAL PHYSICAL  2021   • COLOGUARD  10/10/2022   • MAMMOGRAM  2022   • INFLUENZA VACCINE  Completed   • ZOSTER VACCINE  Completed   • Pneumococcal Vaccine 0-64  Aged Out   • PAP SMEAR  Discontinued   • FECAL OCCULT BLOOD TEST  Discontinued       PMH, PSH, SocHx, FamHx, Allergies, and Medications: Reviewed and updated in the Visit Navigator.     No Known Allergies  Past Medical History:   Diagnosis Date   • Vitamin D deficiency      Past Surgical History:   Procedure Laterality Date   • ANKLE SURGERY     • BREAST BIOPSY     • HYSTERECTOMY       Social History     Socioeconomic History   • Marital status:      Spouse name: Not on file   • Number of children: Not on file   • Years of education: Not on file   • Highest education level: Not on file   Tobacco Use   • Smoking status: Never Smoker   • Smokeless tobacco: Never Used   Substance and Sexual Activity   • Alcohol use: Yes     Alcohol/week: 1.0 standard drinks     Types: 1 Glasses of wine per week   • Drug use: No   • Sexual activity: Defer     Family History   Problem Relation Age of Onset   • Arthritis Mother    • Cancer Mother    • Hypertension Mother    • Hypertension Father    • Breast  "cancer Maternal Aunt    • Breast cancer Paternal Aunt        Review of Systems  Review of Systems   Constitutional: Negative.  Negative for activity change, appetite change, fatigue and fever.   HENT: Negative for congestion, ear discharge, ear pain and trouble swallowing.    Eyes: Negative for photophobia and visual disturbance.   Respiratory: Negative for cough and shortness of breath.    Cardiovascular: Negative for chest pain and palpitations.   Gastrointestinal: Negative for abdominal distention, abdominal pain, constipation, diarrhea, nausea and vomiting.   Endocrine: Negative.    Genitourinary: Negative for dysuria, hematuria and urgency.   Musculoskeletal: Positive for arthralgias. Negative for back pain, joint swelling and myalgias.   Skin: Negative for color change and rash.   Allergic/Immunologic: Negative.    Neurological: Negative for dizziness, weakness, light-headedness and headaches.   Hematological: Negative for adenopathy. Does not bruise/bleed easily.   Psychiatric/Behavioral: Negative for agitation, confusion and dysphoric mood. The patient is not nervous/anxious.        Vitals:    12/17/20 1516   BP: 130/70   Pulse: 87   Temp: 96.6 °F (35.9 °C)   SpO2: 97%       Objective   /70   Pulse 87   Temp 96.6 °F (35.9 °C) (Temporal)   Ht 160 cm (63\")   Wt 66.7 kg (147 lb)   LMP  (LMP Unknown)   SpO2 97%   BMI 26.04 kg/m²     Physical Exam  Constitutional:       General: She is not in acute distress.     Appearance: She is well-developed.   HENT:      Nose: Nose normal.   Eyes:      General: No scleral icterus.     Conjunctiva/sclera: Conjunctivae normal.   Neck:      Thyroid: No thyromegaly.      Trachea: No tracheal deviation.   Cardiovascular:      Rate and Rhythm: Normal rate and regular rhythm.      Heart sounds: No murmur. No friction rub.   Pulmonary:      Effort: No respiratory distress.      Breath sounds: No wheezing or rales.   Abdominal:      General: There is no distension.      " Palpations: Abdomen is soft. There is no mass.      Tenderness: There is no abdominal tenderness. There is no guarding.   Musculoskeletal: Normal range of motion.         General: No deformity.   Lymphadenopathy:      Cervical: No cervical adenopathy.   Skin:     General: Skin is warm and dry.      Findings: No erythema or rash.   Neurological:      Mental Status: She is alert and oriented to person, place, and time.      Cranial Nerves: No cranial nerve deficit.      Coordination: Coordination normal.      Deep Tendon Reflexes: Reflexes are normal and symmetric.   Psychiatric:         Behavior: Behavior normal.         Thought Content: Thought content normal.         Judgment: Judgment normal.         The CVD Risk score (BLANQUITA'Agostino, et al., 2008) failed to calculate for the following reasons:    Cannot find a previous HDL lab    Cannot find a previous total cholesterol lab    Lab Results   Component Value Date     (H) 03/16/2015     Glucose   Date Value Ref Range Status   03/28/2016 78 70 - 100 mg/dL Final     BUN   Date Value Ref Range Status   03/31/2016 11 9 - 23 mg/dL Final     Creatinine   Date Value Ref Range Status   03/31/2016 0.60 0.60 - 1.30 mg/dL Final     Sodium   Date Value Ref Range Status   03/31/2016 143 132 - 146 mmol/L Final     Potassium   Date Value Ref Range Status   03/31/2016 4.1 3.5 - 5.5 mmol/L Final     Chloride   Date Value Ref Range Status   03/31/2016 106 99 - 109 mmol/L Final     Total CO2   Date Value Ref Range Status   03/31/2016 33 (H) 20 - 31 mmol/L Final     Calcium   Date Value Ref Range Status   03/31/2016 9.3 8.7 - 10.4 mg/dL Final     Total Protein   Date Value Ref Range Status   03/28/2016 7.7 5.7 - 8.2 g/dL Final     Albumin   Date Value Ref Range Status   03/31/2016 4.5 3.2 - 4.8 g/dL Final     ALT (SGPT)   Date Value Ref Range Status   03/31/2016 13 7 - 40 U/L Final     AST (SGOT)   Date Value Ref Range Status   03/31/2016 23 0 - 33 U/L Final     Alkaline  Phosphatase   Date Value Ref Range Status   03/31/2016 82 25 - 100 U/L Final     Total Bilirubin   Date Value Ref Range Status   03/31/2016 0.5 0.3 - 1.2 mg/dL Final     eGFR Non  Am   Date Value Ref Range Status   03/31/2016 114 mL/min/1.732 Final     Comment:                                     --------------------------------------                                  National Kid[employer] Guidelines                                                                       .                                  Stage      Description           GFR                                  1          Normal or High        90+                                  2          Mild decrease         60-89                                  3          Moderate decrease     30-59                                  4          Severe decrease       15-29                                  5          Kidney failure        <15       A/G Ratio   Date Value Ref Range Status   03/28/2016 CANCELED       Comment:     Result canceled by the ancillary     BUN/Creatinine Ratio   Date Value Ref Range Status   03/28/2016 CANCELED       Comment:     Result canceled by the ancillary     Anion Gap   Date Value Ref Range Status   03/28/2016 4 3 - 11 mmol/L Final     No results found for: WBC, HGB, HCT, MCV, PLT     Assessment/Plan   1. Healthy female exam.   2. Patient Counseling: Including but not Limited to the following, when appropriate:  --Nutrition: Stressed importance of moderation in sodium/caffeine intake, saturated fat and cholesterol, caloric balance, sufficient intake of fresh fruits, vegetables, fiber, calcium, iron, --Discussed the issue of estrogen replacement, calcium supplement, and the daily use of baby aspirin.  --Exercise: Stressed the importance of regular exercise.   --Substance Abuse: Discussed cessation/primary prevention of tobacco, alcohol, or other drug use; driving or other dangerous activities under the influence; availability  of treatment for abuse, as indicated based on social history.    --Sexuality: Discussed sexually transmitted diseases, partner selection, use of condoms, avoidance of unintended pregnancy  and contraceptive alternatives.   --Injury prevention: Discussed safety belts, safety helmets, smoke detector, smoking near bedding or upholstery.   --Dental health: Discussed importance of regular tooth brushing, flossing, and dental visits.  --Immunizations reviewed.  --Discussed benefits of colon cancer screening.      3. Discussed the patient's BMI with her.  The BMI is in the acceptable range  4. No follow-ups on file.  5. Age-appropriate Screening Scheduled  6. There are no Patient Instructions on file for this visit.    Assessment/Plan     Diagnoses and all orders for this visit:    1. Routine general medical examination at a health care facility (Primary)    2. Essential hypertension stable with current meds and low-salt diet    Other orders  -     quinapril (ACCUPRIL) 40 MG tablet; Take 1 tablet by mouth Daily.  Dispense: 90 tablet; Refill: 3  -     hydroCHLOROthiazide (HYDRODIURIL) 12.5 MG tablet; Take 1 tablet by mouth Daily.  Dispense: 90 tablet; Refill: 3  -     amLODIPine (NORVASC) 10 MG tablet; Take 1 tablet by mouth Daily.  Dispense: 90 tablet; Refill: 3

## 2020-12-18 LAB
ALBUMIN SERPL-MCNC: 4.7 G/DL (ref 3.5–5.2)
ALBUMIN/GLOB SERPL: 1.7 G/DL
ALP SERPL-CCNC: 89 U/L (ref 39–117)
ALT SERPL-CCNC: 10 U/L (ref 1–33)
AST SERPL-CCNC: 20 U/L (ref 1–32)
BILIRUB SERPL-MCNC: 0.3 MG/DL (ref 0–1.2)
BUN SERPL-MCNC: 13 MG/DL (ref 8–23)
BUN/CREAT SERPL: 16.7 (ref 7–25)
CALCIUM SERPL-MCNC: 10 MG/DL (ref 8.6–10.5)
CHLORIDE SERPL-SCNC: 102 MMOL/L (ref 98–107)
CO2 SERPL-SCNC: 27 MMOL/L (ref 22–29)
CREAT SERPL-MCNC: 0.78 MG/DL (ref 0.57–1)
ERYTHROCYTE [DISTWIDTH] IN BLOOD BY AUTOMATED COUNT: 12.6 % (ref 12.3–15.4)
GLOBULIN SER CALC-MCNC: 2.7 GM/DL
GLUCOSE SERPL-MCNC: 90 MG/DL (ref 65–99)
HCT VFR BLD AUTO: 39.7 % (ref 34–46.6)
HCV AB S/CO SERPL IA: <0.1 S/CO RATIO (ref 0–0.9)
HGB BLD-MCNC: 13.8 G/DL (ref 12–15.9)
LDLC SERPL DIRECT ASSAY-MCNC: 135 MG/DL (ref 0–99)
MCH RBC QN AUTO: 31 PG (ref 26.6–33)
MCHC RBC AUTO-ENTMCNC: 34.8 G/DL (ref 31.5–35.7)
MCV RBC AUTO: 89.2 FL (ref 79–97)
PLATELET # BLD AUTO: 253 10*3/MM3 (ref 140–450)
POTASSIUM SERPL-SCNC: 3.9 MMOL/L (ref 3.5–5.2)
PROT SERPL-MCNC: 7.4 G/DL (ref 6–8.5)
RBC # BLD AUTO: 4.45 10*6/MM3 (ref 3.77–5.28)
SODIUM SERPL-SCNC: 142 MMOL/L (ref 136–145)
WBC # BLD AUTO: 6.95 10*3/MM3 (ref 3.4–10.8)

## 2021-09-16 DIAGNOSIS — Z00.00 ROUTINE GENERAL MEDICAL EXAMINATION AT A HEALTH CARE FACILITY: Primary | ICD-10-CM

## 2021-11-06 LAB
ALBUMIN SERPL-MCNC: 4.6 G/DL (ref 3.5–5.2)
ALBUMIN/GLOB SERPL: 1.7 G/DL
ALP SERPL-CCNC: 86 U/L (ref 39–117)
ALT SERPL-CCNC: 11 U/L (ref 1–33)
AST SERPL-CCNC: 22 U/L (ref 1–32)
BILIRUB SERPL-MCNC: 0.2 MG/DL (ref 0–1.2)
BUN SERPL-MCNC: 12 MG/DL (ref 8–23)
BUN/CREAT SERPL: 15.6 (ref 7–25)
CALCIUM SERPL-MCNC: 10.1 MG/DL (ref 8.6–10.5)
CHLORIDE SERPL-SCNC: 101 MMOL/L (ref 98–107)
CHOLEST SERPL-MCNC: 221 MG/DL (ref 0–200)
CO2 SERPL-SCNC: 32.3 MMOL/L (ref 22–29)
CREAT SERPL-MCNC: 0.77 MG/DL (ref 0.57–1)
GLOBULIN SER CALC-MCNC: 2.7 GM/DL
GLUCOSE SERPL-MCNC: 93 MG/DL (ref 65–99)
HDLC SERPL-MCNC: 71 MG/DL (ref 40–60)
LDLC SERPL CALC-MCNC: 132 MG/DL (ref 0–100)
POTASSIUM SERPL-SCNC: 4.6 MMOL/L (ref 3.5–5.2)
PROT SERPL-MCNC: 7.3 G/DL (ref 6–8.5)
SODIUM SERPL-SCNC: 138 MMOL/L (ref 136–145)
TRIGL SERPL-MCNC: 104 MG/DL (ref 0–150)
VLDLC SERPL CALC-MCNC: 18 MG/DL (ref 5–40)

## 2021-11-11 ENCOUNTER — TRANSCRIBE ORDERS (OUTPATIENT)
Dept: ADMINISTRATIVE | Facility: HOSPITAL | Age: 61
End: 2021-11-11

## 2021-11-11 DIAGNOSIS — Z12.31 VISIT FOR SCREENING MAMMOGRAM: Primary | ICD-10-CM

## 2021-12-13 ENCOUNTER — OFFICE VISIT (OUTPATIENT)
Dept: INTERNAL MEDICINE | Facility: CLINIC | Age: 61
End: 2021-12-13

## 2021-12-13 VITALS
TEMPERATURE: 97.3 F | HEIGHT: 63 IN | DIASTOLIC BLOOD PRESSURE: 70 MMHG | OXYGEN SATURATION: 100 % | WEIGHT: 140 LBS | BODY MASS INDEX: 24.8 KG/M2 | HEART RATE: 62 BPM | SYSTOLIC BLOOD PRESSURE: 110 MMHG

## 2021-12-13 DIAGNOSIS — Z23 NEED FOR VACCINATION: ICD-10-CM

## 2021-12-13 DIAGNOSIS — Z00.00 ROUTINE GENERAL MEDICAL EXAMINATION AT A HEALTH CARE FACILITY: Primary | ICD-10-CM

## 2021-12-13 PROCEDURE — 99396 PREV VISIT EST AGE 40-64: CPT | Performed by: INTERNAL MEDICINE

## 2021-12-13 PROCEDURE — 90715 TDAP VACCINE 7 YRS/> IM: CPT | Performed by: INTERNAL MEDICINE

## 2021-12-13 PROCEDURE — 90471 IMMUNIZATION ADMIN: CPT | Performed by: INTERNAL MEDICINE

## 2021-12-13 RX ORDER — HYDROCHLOROTHIAZIDE 12.5 MG/1
12.5 TABLET ORAL DAILY
Qty: 90 TABLET | Refills: 3 | Status: SHIPPED | OUTPATIENT
Start: 2021-12-13 | End: 2022-12-16 | Stop reason: SDUPTHER

## 2021-12-13 RX ORDER — QUINAPRIL 40 MG/1
40 TABLET ORAL DAILY
Qty: 90 TABLET | Refills: 3 | Status: SHIPPED | OUTPATIENT
Start: 2021-12-13 | End: 2022-12-16 | Stop reason: SDUPTHER

## 2021-12-13 RX ORDER — AMLODIPINE BESYLATE 10 MG/1
10 TABLET ORAL DAILY
Qty: 90 TABLET | Refills: 3 | Status: SHIPPED | OUTPATIENT
Start: 2021-12-13 | End: 2022-12-15 | Stop reason: SDUPTHER

## 2021-12-13 NOTE — PROGRESS NOTES
Chief Complaint   Patient presents with   • Hypertension     med refill before trip - discuss HCTZ dose        Evelyne Cantu is a 61 y.o. female and is here for a comprehensive physical exam. The patient reports no problems.     History:  LMP: No LMP recorded (lmp unknown). Patient has had a hysterectomy.  Menopause at 48 years  Last pap date:   Abnormal pap? no  : 2  Para: 2    Do you take any herbs or supplements that were not prescribed by a doctor? yes  Are you taking calcium supplements? no  Are you taking aspirin daily? no      Health Habits:  Dental Exam. up to date  Eye Exam. up to date  Exercise: 5 times/week.  Current exercise activities include: walking, weightlifting and yoga    Health Maintenance   Topic Date Due   • TDAP/TD VACCINES (1 - Tdap) Never done   • ANNUAL PHYSICAL  2021   • MAMMOGRAM  2022   • HEPATITIS C SCREENING  Completed   • COVID-19 Vaccine  Completed   • INFLUENZA VACCINE  Completed   • ZOSTER VACCINE  Completed   • Pneumococcal Vaccine 0-64  Aged Out   • PAP SMEAR  Discontinued   • COLORECTAL CANCER SCREENING  Discontinued       PMH, PSH, SocHx, FamHx, Allergies, and Medications: Reviewed and updated in the Visit Navigator.     No Known Allergies  Past Medical History:   Diagnosis Date   • Vitamin D deficiency      Past Surgical History:   Procedure Laterality Date   • ANKLE SURGERY     • BREAST BIOPSY     • HYSTERECTOMY       Social History     Socioeconomic History   • Marital status:    Tobacco Use   • Smoking status: Never Smoker   • Smokeless tobacco: Never Used   Substance and Sexual Activity   • Alcohol use: Yes     Alcohol/week: 1.0 standard drink     Types: 1 Glasses of wine per week   • Drug use: No   • Sexual activity: Defer     Family History   Problem Relation Age of Onset   • Arthritis Mother    • Cancer Mother    • Hypertension Mother    • Hypertension Father    • Breast cancer Maternal Aunt    • Breast cancer Paternal Aunt   "      Review of Systems  Review of Systems   Constitutional: Negative.  Negative for activity change, appetite change, fatigue and fever.   HENT: Negative for congestion, ear discharge, ear pain and trouble swallowing.    Eyes: Negative for photophobia and visual disturbance.   Respiratory: Negative for cough and shortness of breath.    Cardiovascular: Negative for chest pain and palpitations.   Gastrointestinal: Negative for abdominal distention, abdominal pain, constipation, diarrhea, nausea and vomiting.   Endocrine: Negative.    Genitourinary: Negative for dysuria, hematuria and urgency.   Musculoskeletal: Negative for arthralgias, back pain, joint swelling and myalgias.   Skin: Negative for color change and rash.   Allergic/Immunologic: Negative.    Neurological: Negative for dizziness, weakness, light-headedness and headaches.   Hematological: Negative for adenopathy. Does not bruise/bleed easily.   Psychiatric/Behavioral: Negative for agitation, confusion and dysphoric mood. The patient is not nervous/anxious.        Vitals:    12/13/21 1553   BP: 110/70   Pulse: 62   Temp: 97.3 °F (36.3 °C)   SpO2: 100%       Objective   /70   Pulse 62   Temp 97.3 °F (36.3 °C) (Infrared)   Ht 160 cm (63\")   Wt 63.5 kg (140 lb)   LMP  (LMP Unknown)   SpO2 100%   BMI 24.80 kg/m²     Physical Exam  Constitutional:       General: She is not in acute distress.     Appearance: She is well-developed.   HENT:      Nose: Nose normal.   Eyes:      General: No scleral icterus.     Conjunctiva/sclera: Conjunctivae normal.   Neck:      Thyroid: No thyromegaly.      Trachea: No tracheal deviation.   Cardiovascular:      Rate and Rhythm: Normal rate and regular rhythm.      Heart sounds: No murmur heard.  No friction rub.   Pulmonary:      Effort: No respiratory distress.      Breath sounds: No wheezing or rales.   Abdominal:      General: There is no distension.      Palpations: Abdomen is soft. There is no mass.      " Tenderness: There is no abdominal tenderness. There is no guarding.   Musculoskeletal:         General: No deformity. Normal range of motion.   Lymphadenopathy:      Cervical: No cervical adenopathy.   Skin:     General: Skin is warm and dry.      Findings: No erythema or rash.   Neurological:      Mental Status: She is alert and oriented to person, place, and time.      Cranial Nerves: No cranial nerve deficit.      Coordination: Coordination normal.      Deep Tendon Reflexes: Reflexes are normal and symmetric.   Psychiatric:         Behavior: Behavior normal.         Thought Content: Thought content normal.         Judgment: Judgment normal.         The 10-year CVD risk score (BLANQUITA'Agostino, et al., 2008) is: 5.8%    Values used to calculate the score:      Age: 61 years      Sex: Female      Diabetic: No      Tobacco smoker: No      Systolic Blood Pressure: 110 mmHg      Is BP treated: Yes      HDL Cholesterol: 71 mg/dL      Total Cholesterol: 221 mg/dL    Lab Results   Component Value Date    CHLPL 221 (H) 11/05/2021    TRIG 104 11/05/2021    HDL 71 (H) 11/05/2021     (H) 11/05/2021     Glucose   Date Value Ref Range Status   11/05/2021 93 65 - 99 mg/dL Final     BUN   Date Value Ref Range Status   11/05/2021 12 8 - 23 mg/dL Final     Creatinine   Date Value Ref Range Status   11/05/2021 0.77 0.57 - 1.00 mg/dL Final     Sodium   Date Value Ref Range Status   11/05/2021 138 136 - 145 mmol/L Final     Potassium   Date Value Ref Range Status   11/05/2021 4.6 3.5 - 5.2 mmol/L Final     Chloride   Date Value Ref Range Status   11/05/2021 101 98 - 107 mmol/L Final     Total CO2   Date Value Ref Range Status   11/05/2021 32.3 (H) 22.0 - 29.0 mmol/L Final     Calcium   Date Value Ref Range Status   11/05/2021 10.1 8.6 - 10.5 mg/dL Final     Total Protein   Date Value Ref Range Status   03/28/2016 7.7 5.7 - 8.2 g/dL Final     Albumin   Date Value Ref Range Status   11/05/2021 4.60 3.50 - 5.20 g/dL Final     ALT  (SGPT)   Date Value Ref Range Status   11/05/2021 11 1 - 33 U/L Final     AST (SGOT)   Date Value Ref Range Status   11/05/2021 22 1 - 32 U/L Final     Alkaline Phosphatase   Date Value Ref Range Status   11/05/2021 86 39 - 117 U/L Final     Total Bilirubin   Date Value Ref Range Status   11/05/2021 0.2 0.0 - 1.2 mg/dL Final     eGFR Non  Am   Date Value Ref Range Status   11/05/2021 76 >60 mL/min/1.73 Final     Comment:     GFR Normal >60  Chronic Kidney Disease <60  Kidney Failure <15       A/G Ratio   Date Value Ref Range Status   11/05/2021 1.7 g/dL Final     BUN/Creatinine Ratio   Date Value Ref Range Status   11/05/2021 15.6 7.0 - 25.0 Final     Anion Gap   Date Value Ref Range Status   03/28/2016 4 3 - 11 mmol/L Final     Lab Results   Component Value Date    WBC 6.95 12/17/2020    HGB 13.8 12/17/2020    HCT 39.7 12/17/2020    MCV 89.2 12/17/2020     12/17/2020        Assessment/Plan   1. Healthy female exam.   2. Patient Counseling: Including but not Limited to the following, when appropriate:  --Nutrition: Stressed importance of moderation in sodium/caffeine intake, saturated fat and cholesterol, caloric balance, sufficient intake of fresh fruits, vegetables, fiber, calcium,--Discussed the issue of estrogen replacement, calcium supplement,  --Exercise: Stressed the importance of regular exercise.   --Substance Abuse: Discussed cessation/primary prevention of tobacco, alcohol, or other drug use; driving or other dangerous activities under the influence; availability of treatment for abuse, as indicated based on social history.    --Sexuality: Discussed sexually transmitted diseases, partner selection, use of condoms, avoidance of unintended pregnancy  and contraceptive alternatives.   --Injury prevention: Discussed safety belts, safety helmets, smoke detector, smoking near bedding or upholstery.   --Dental health: Discussed importance of regular tooth brushing, flossing, and dental  visits.  --Immunizations reviewed.  --Discussed benefits of colon cancer screening.      3. Discussed the patient's BMI with her.  The BMI is in the acceptable range  4. No follow-ups on file.  5. Age-appropriate Screening Scheduled  6. There are no Patient Instructions on file for this visit.    Assessment/Plan     Diagnoses and all orders for this visit:    1. Routine general medical examination at a health care facility (Primary)           Answers for HPI/ROS submitted by the patient on 12/6/2021  Please describe your symptoms.: Need refills on blood pressure meds, otherwise, doing well.  Have you had these symptoms before?: Yes  How long have you been having these symptoms?: Greater than 2 weeks  Please list any medications you are currently taking for this condition.: Norvasc, HCTZ, Quinapril  Please describe any probable cause for these symptoms. : None  What is the primary reason for your visit?: Other

## 2021-12-15 ENCOUNTER — HOSPITAL ENCOUNTER (OUTPATIENT)
Dept: MAMMOGRAPHY | Facility: HOSPITAL | Age: 61
Discharge: HOME | End: 2021-12-15
Admitting: INTERNAL MEDICINE

## 2021-12-15 DIAGNOSIS — Z12.31 VISIT FOR SCREENING MAMMOGRAM: ICD-10-CM

## 2021-12-15 PROCEDURE — 77063 BREAST TOMOSYNTHESIS BI: CPT

## 2021-12-15 PROCEDURE — 77067 SCR MAMMO BI INCL CAD: CPT

## 2022-01-11 ENCOUNTER — HOSPITAL ENCOUNTER (OUTPATIENT)
Dept: MAMMOGRAPHY | Facility: HOSPITAL | Age: 62
End: 2022-01-11

## 2022-12-15 ENCOUNTER — OFFICE VISIT (OUTPATIENT)
Dept: INTERNAL MEDICINE | Facility: CLINIC | Age: 62
End: 2022-12-15

## 2022-12-15 VITALS
TEMPERATURE: 97.5 F | OXYGEN SATURATION: 97 % | DIASTOLIC BLOOD PRESSURE: 60 MMHG | HEART RATE: 72 BPM | WEIGHT: 146 LBS | HEIGHT: 64 IN | BODY MASS INDEX: 24.92 KG/M2 | SYSTOLIC BLOOD PRESSURE: 120 MMHG

## 2022-12-15 DIAGNOSIS — Z00.00 ROUTINE GENERAL MEDICAL EXAMINATION AT A HEALTH CARE FACILITY: Primary | ICD-10-CM

## 2022-12-15 PROCEDURE — 99396 PREV VISIT EST AGE 40-64: CPT | Performed by: INTERNAL MEDICINE

## 2022-12-15 RX ORDER — AMLODIPINE BESYLATE 10 MG/1
10 TABLET ORAL DAILY
Qty: 90 TABLET | Refills: 3 | Status: SHIPPED | OUTPATIENT
Start: 2022-12-15

## 2022-12-15 NOTE — PROGRESS NOTES
Answers for HPI/ROS submitted by the patient on 2022  What is the primary reason for your visit?: Physical    Chief Complaint   Patient presents with   • Annual Exam       Evelyne Cantu is a 62 y.o. female and is here for a comprehensive physical exam. The patient reports no problems.     History:  LMP: No LMP recorded (lmp unknown). Patient has had a hysterectomy.  Menopause at 48 years  Last pap date:   Abnormal pap? no  : 2  Para: 2    Do you take any herbs or supplements that were not prescribed by a doctor? no  Are you taking calcium supplements? no  Are you taking aspirin daily? no      Health Habits:  Dental Exam. up to date  Eye Exam. up to date  Exercise: 0 times/week.  Current exercise activities include: walking    Health Maintenance   Topic Date Due   • ANNUAL PHYSICAL  2022   • MAMMOGRAM  12/15/2023   • TDAP/TD VACCINES (2 - Td or Tdap) 2031   • HEPATITIS C SCREENING  Completed   • COVID-19 Vaccine  Completed   • INFLUENZA VACCINE  Completed   • ZOSTER VACCINE  Completed   • Pneumococcal Vaccine 0-64  Aged Out   • PAP SMEAR  Discontinued   • COLORECTAL CANCER SCREENING  Discontinued       PMH, PSH, SocHx, FamHx, Allergies, and Medications: Reviewed and updated in the Visit Navigator.     No Known Allergies  Past Medical History:   Diagnosis Date   • Allergic    • Arthritis    • Hypertension    • Vitamin D deficiency      Past Surgical History:   Procedure Laterality Date   • ANKLE SURGERY     • BREAST BIOPSY     • HYSTERECTOMY       Social History     Socioeconomic History   • Marital status:    Tobacco Use   • Smoking status: Never   • Smokeless tobacco: Never   Substance and Sexual Activity   • Alcohol use: Yes     Alcohol/week: 1.0 standard drink     Types: 1 Glasses of wine per week   • Drug use: No   • Sexual activity: Defer     Family History   Problem Relation Age of Onset   • Arthritis Mother    • Cancer Mother    • Hypertension Mother    •  "Hypertension Father    • Breast cancer Maternal Aunt    • Breast cancer Paternal Aunt        Review of Systems  Review of Systems   Constitutional: Negative.  Negative for activity change, appetite change, fatigue and fever.   HENT: Negative for congestion, ear discharge, ear pain and trouble swallowing.    Eyes: Negative for photophobia and visual disturbance.   Respiratory: Negative for cough and shortness of breath.    Cardiovascular: Negative for chest pain and palpitations.   Gastrointestinal: Negative for abdominal distention, abdominal pain, constipation, diarrhea, nausea and vomiting.   Endocrine: Negative.    Genitourinary: Negative for dysuria, hematuria and urgency.   Musculoskeletal: Positive for arthralgias. Negative for back pain, joint swelling and myalgias.   Skin: Negative for color change and rash.   Allergic/Immunologic: Negative.    Neurological: Negative for dizziness, weakness, light-headedness and headaches.   Hematological: Negative for adenopathy. Does not bruise/bleed easily.   Psychiatric/Behavioral: Negative for agitation, confusion and dysphoric mood. The patient is not nervous/anxious.        Vitals:    12/15/22 1452   BP: 120/60   Pulse: 72   Temp: 97.5 °F (36.4 °C)   SpO2: 97%       Objective   /60   Pulse 72   Temp 97.5 °F (36.4 °C) (Infrared)   Ht 162.6 cm (64\")   Wt 66.2 kg (146 lb)   LMP  (LMP Unknown)   SpO2 97%   BMI 25.06 kg/m²     Physical Exam  Constitutional:       General: She is not in acute distress.     Appearance: She is well-developed.   HENT:      Nose: Nose normal.   Eyes:      General: No scleral icterus.     Conjunctiva/sclera: Conjunctivae normal.   Neck:      Thyroid: No thyromegaly.      Trachea: No tracheal deviation.   Cardiovascular:      Rate and Rhythm: Normal rate and regular rhythm.      Heart sounds: No murmur heard.    No friction rub.   Pulmonary:      Effort: No respiratory distress.      Breath sounds: No wheezing or rales.   Abdominal: "      General: There is no distension.      Palpations: Abdomen is soft. There is no mass.      Tenderness: There is no abdominal tenderness. There is no guarding.   Musculoskeletal:         General: No deformity. Normal range of motion.   Lymphadenopathy:      Cervical: No cervical adenopathy.   Skin:     General: Skin is warm and dry.      Findings: No erythema or rash.   Neurological:      Mental Status: She is alert and oriented to person, place, and time.      Cranial Nerves: No cranial nerve deficit.      Coordination: Coordination normal.      Deep Tendon Reflexes: Reflexes are normal and symmetric.   Psychiatric:         Behavior: Behavior normal.         Thought Content: Thought content normal.         Judgment: Judgment normal.         The 10-year CVD risk score (BLANQUITA'Agostino, et al., 2008) is: 7.6%    Values used to calculate the score:      Age: 62 years      Sex: Female      Diabetic: No      Tobacco smoker: No      Systolic Blood Pressure: 120 mmHg      Is BP treated: Yes      HDL Cholesterol: 71 mg/dL      Total Cholesterol: 221 mg/dL    Lab Results   Component Value Date    CHLPL 221 (H) 11/05/2021    TRIG 104 11/05/2021    HDL 71 (H) 11/05/2021     (H) 11/05/2021     Glucose   Date Value Ref Range Status   11/05/2021 93 65 - 99 mg/dL Final     BUN   Date Value Ref Range Status   11/05/2021 12 8 - 23 mg/dL Final     Creatinine   Date Value Ref Range Status   11/05/2021 0.77 0.57 - 1.00 mg/dL Final     Sodium   Date Value Ref Range Status   11/05/2021 138 136 - 145 mmol/L Final     Potassium   Date Value Ref Range Status   11/05/2021 4.6 3.5 - 5.2 mmol/L Final     Chloride   Date Value Ref Range Status   11/05/2021 101 98 - 107 mmol/L Final     Total CO2   Date Value Ref Range Status   11/05/2021 32.3 (H) 22.0 - 29.0 mmol/L Final     Calcium   Date Value Ref Range Status   11/05/2021 10.1 8.6 - 10.5 mg/dL Final     Total Protein   Date Value Ref Range Status   03/28/2016 7.7 5.7 - 8.2 g/dL Final      Albumin   Date Value Ref Range Status   11/05/2021 4.60 3.50 - 5.20 g/dL Final     ALT (SGPT)   Date Value Ref Range Status   11/05/2021 11 1 - 33 U/L Final     AST (SGOT)   Date Value Ref Range Status   11/05/2021 22 1 - 32 U/L Final     Alkaline Phosphatase   Date Value Ref Range Status   11/05/2021 86 39 - 117 U/L Final     Total Bilirubin   Date Value Ref Range Status   11/05/2021 0.2 0.0 - 1.2 mg/dL Final     eGFR Non  Am   Date Value Ref Range Status   11/05/2021 76 >60 mL/min/1.73 Final     Comment:     GFR Normal >60  Chronic Kidney Disease <60  Kidney Failure <15       A/G Ratio   Date Value Ref Range Status   11/05/2021 1.7 g/dL Final     BUN/Creatinine Ratio   Date Value Ref Range Status   11/05/2021 15.6 7.0 - 25.0 Final     Anion Gap   Date Value Ref Range Status   03/28/2016 4 3 - 11 mmol/L Final     Lab Results   Component Value Date    WBC 6.95 12/17/2020    HGB 13.8 12/17/2020    HCT 39.7 12/17/2020    MCV 89.2 12/17/2020     12/17/2020        Assessment & Plan   1. Healthy female exam.   2. Patient Counseling: Including but not Limited to the following, when appropriate:  --Nutrition: Stressed importance of moderation in sodium/caffeine intake, saturated fat and cholesterol, caloric balance, sufficient intake of fresh fruits, vegetables, fiber, calcium, iron, --Discussed the issue of estrogen replacement, calcium supplement, --Exercise: Stressed the importance of regular exercise.   --Substance Abuse: Discussed cessation/primary prevention of tobacco, alcohol, or other drug use; driving or other dangerous activities under the influence; availability of treatment for abuse, as indicated based on social history.    --Sexuality: Discussed sexually transmitted diseases, partner selection, use of condoms, avoidance of unintended pregnancy  and contraceptive alternatives.   --Injury prevention: Discussed safety belts, safety helmets, smoke detector, smoking near bedding or upholstery.    --Dental health: Discussed importance of regular tooth brushing, flossing, and dental visits.  --Immunizations reviewed.  --Discussed benefits of colon cancer screening.      3. Discussed the patient's BMI with her.  The BMI is in the acceptable range  4. No follow-ups on file.  5. Age-appropriate Screening Scheduled  6. There are no Patient Instructions on file for this visit.    Assessment & Plan     Diagnoses and all orders for this visit:    1. Routine general medical examination at a health care facility (Primary)    Other orders  -     amLODIPine (NORVASC) 10 MG tablet; Take 1 tablet by mouth Daily.  Dispense: 90 tablet; Refill: 3

## 2022-12-16 ENCOUNTER — PATIENT MESSAGE (OUTPATIENT)
Dept: INTERNAL MEDICINE | Facility: CLINIC | Age: 62
End: 2022-12-16

## 2022-12-16 RX ORDER — HYDROCHLOROTHIAZIDE 12.5 MG/1
12.5 TABLET ORAL DAILY
Qty: 90 TABLET | Refills: 3 | Status: SHIPPED | OUTPATIENT
Start: 2022-12-16

## 2022-12-16 RX ORDER — HYDROCHLOROTHIAZIDE 12.5 MG/1
12.5 TABLET ORAL DAILY
Qty: 90 TABLET | Refills: 3 | Status: CANCELLED | OUTPATIENT
Start: 2022-12-16

## 2022-12-16 RX ORDER — QUINAPRIL 40 MG/1
40 TABLET ORAL DAILY
Qty: 90 TABLET | Refills: 3 | Status: SHIPPED | OUTPATIENT
Start: 2022-12-16 | End: 2023-03-16

## 2022-12-16 RX ORDER — QUINAPRIL 40 MG/1
40 TABLET ORAL DAILY
Qty: 90 TABLET | Refills: 3 | Status: CANCELLED | OUTPATIENT
Start: 2022-12-16

## 2022-12-16 NOTE — TELEPHONE ENCOUNTER
From: Evelyne Cantu  Sent: 12/16/2022 9:26 AM EST  To: [facility]  Subject: Prescriptions    [facility] said they did not receive refill order for HTCZ 12.5 mg or quinapril 40 mg.    They did receive refill for amlodipine 10 mg.

## 2023-03-16 ENCOUNTER — PATIENT MESSAGE (OUTPATIENT)
Dept: INTERNAL MEDICINE | Facility: CLINIC | Age: 63
End: 2023-03-16
Payer: COMMERCIAL

## 2023-03-16 RX ORDER — LISINOPRIL 40 MG/1
40 TABLET ORAL DAILY
Qty: 90 TABLET | Refills: 3 | Status: CANCELLED | OUTPATIENT
Start: 2023-03-16

## 2023-03-16 RX ORDER — LISINOPRIL 40 MG/1
40 TABLET ORAL DAILY
Qty: 90 TABLET | Refills: 3 | OUTPATIENT
Start: 2023-03-16

## 2023-04-21 ENCOUNTER — TRANSCRIBE ORDERS (OUTPATIENT)
Dept: ADMINISTRATIVE | Facility: HOSPITAL | Age: 63
End: 2023-04-21
Payer: COMMERCIAL

## 2023-04-21 DIAGNOSIS — Z12.31 VISIT FOR SCREENING MAMMOGRAM: Primary | ICD-10-CM

## 2023-11-09 ENCOUNTER — OFFICE VISIT (OUTPATIENT)
Dept: INTERNAL MEDICINE | Facility: CLINIC | Age: 63
End: 2023-11-09
Payer: COMMERCIAL

## 2023-11-09 VITALS
OXYGEN SATURATION: 97 % | BODY MASS INDEX: 24.72 KG/M2 | HEART RATE: 57 BPM | HEIGHT: 64 IN | SYSTOLIC BLOOD PRESSURE: 123 MMHG | TEMPERATURE: 98.2 F | DIASTOLIC BLOOD PRESSURE: 72 MMHG | WEIGHT: 144.8 LBS

## 2023-11-09 DIAGNOSIS — I10 HTN (HYPERTENSION), BENIGN: Primary | ICD-10-CM

## 2023-11-09 DIAGNOSIS — Z00.00 ROUTINE GENERAL MEDICAL EXAMINATION AT A HEALTH CARE FACILITY: ICD-10-CM

## 2023-11-09 RX ORDER — LISINOPRIL 40 MG/1
40 TABLET ORAL DAILY
Qty: 90 TABLET | Refills: 3 | Status: SHIPPED | OUTPATIENT
Start: 2023-11-09

## 2023-11-09 RX ORDER — HYDROCHLOROTHIAZIDE 12.5 MG/1
12.5 TABLET ORAL DAILY
Qty: 90 TABLET | Refills: 3 | Status: SHIPPED | OUTPATIENT
Start: 2023-11-09

## 2023-11-09 RX ORDER — AMLODIPINE BESYLATE 10 MG/1
10 TABLET ORAL DAILY
Qty: 90 TABLET | Refills: 3 | Status: SHIPPED | OUTPATIENT
Start: 2023-11-09

## 2023-11-09 RX ORDER — ESTRADIOL 0.1 MG/G
CREAM VAGINAL SEE ADMIN INSTRUCTIONS
COMMUNITY
Start: 2023-09-26

## 2023-11-09 NOTE — PROGRESS NOTES
Chief Complaint   Patient presents with    Med Refill       Evelyne Cantu is a 63 y.o. female and is here for a comprehensive physical exam. The patient reports problems - htn .     History:  LMP: No LMP recorded (lmp unknown). Patient has had a hysterectomy.  Menopause at 48 years  Last pap date:   Abnormal pap? no  : 2  Para: 2    Do you take any herbs or supplements that were not prescribed by a doctor? no  Are you taking calcium supplements? no  Are you taking aspirin daily? no      Health Habits:  Dental Exam. up to date  Eye Exam. up to date  Exercise: 5 times/week.  Current exercise activities include: walking and yoga    Health Maintenance   Topic Date Due    ANNUAL PHYSICAL  12/15/2023    MAMMOGRAM  2025    TDAP/TD VACCINES (2 - Td or Tdap) 2031    HEPATITIS C SCREENING  Completed    COVID-19 Vaccine  Completed    INFLUENZA VACCINE  Completed    ZOSTER VACCINE  Completed    Pneumococcal Vaccine 0-64  Aged Out    PAP SMEAR  Discontinued    COLORECTAL CANCER SCREENING  Discontinued       PMH, PSH, SocHx, FamHx, Allergies, and Medications: Reviewed and updated in the Visit Navigator.     No Known Allergies  Past Medical History:   Diagnosis Date    Allergic     Arthritis     Hypertension     Vitamin D deficiency      Past Surgical History:   Procedure Laterality Date    ANKLE SURGERY      BREAST BIOPSY      FRACTURE SURGERY  2004    HYSTERECTOMY       Social History     Socioeconomic History    Marital status:    Tobacco Use    Smoking status: Never    Smokeless tobacco: Never   Substance and Sexual Activity    Alcohol use: Yes     Alcohol/week: 1.0 standard drink of alcohol     Types: 1 Glasses of wine per week    Drug use: No    Sexual activity: Yes     Partners: Male     Birth control/protection: Post-menopausal     Family History   Problem Relation Age of Onset    Arthritis Mother     Cancer Mother     Hypertension Mother     Hypertension Father     Breast  "cancer Maternal Aunt     Breast cancer Paternal Aunt     Hypertension Brother        Review of Systems  Review of Systems   Constitutional: Negative.  Negative for activity change, appetite change, fatigue and fever.   HENT:  Negative for congestion, ear discharge, ear pain and trouble swallowing.    Eyes:  Negative for photophobia and visual disturbance.   Respiratory:  Negative for cough and shortness of breath.    Cardiovascular:  Negative for chest pain and palpitations.   Gastrointestinal:  Negative for abdominal distention, abdominal pain, constipation, diarrhea, nausea and vomiting.   Endocrine: Negative.    Genitourinary:  Negative for dysuria, hematuria and urgency.   Musculoskeletal:  Negative for arthralgias, back pain, joint swelling and myalgias.   Skin:  Negative for color change and rash.   Allergic/Immunologic: Negative.    Neurological:  Negative for dizziness, weakness, light-headedness and headaches.   Hematological:  Negative for adenopathy. Does not bruise/bleed easily.   Psychiatric/Behavioral:  Negative for agitation, confusion and dysphoric mood. The patient is not nervous/anxious.        Vitals:    11/09/23 1637   BP: 123/72   Pulse: 57   Temp: 98.2 °F (36.8 °C)   SpO2: 97%       Objective   /72   Pulse 57   Temp 98.2 °F (36.8 °C)   Ht 162.6 cm (64.02\")   Wt 65.7 kg (144 lb 12.8 oz)   LMP  (LMP Unknown)   SpO2 97%   BMI 24.84 kg/m²     Physical Exam  Constitutional:       General: She is not in acute distress.     Appearance: She is well-developed.   HENT:      Nose: Nose normal.   Eyes:      General: No scleral icterus.     Conjunctiva/sclera: Conjunctivae normal.   Neck:      Thyroid: No thyromegaly.      Trachea: No tracheal deviation.   Cardiovascular:      Rate and Rhythm: Normal rate and regular rhythm.      Heart sounds: No murmur heard.     No friction rub.   Pulmonary:      Effort: No respiratory distress.      Breath sounds: No wheezing or rales.   Abdominal:      " General: There is no distension.      Palpations: Abdomen is soft. There is no mass.      Tenderness: There is no abdominal tenderness. There is no guarding.   Musculoskeletal:         General: No deformity. Normal range of motion.   Lymphadenopathy:      Cervical: No cervical adenopathy.   Skin:     General: Skin is warm and dry.      Findings: No erythema or rash.   Neurological:      Mental Status: She is alert and oriented to person, place, and time.      Cranial Nerves: No cranial nerve deficit.      Coordination: Coordination normal.      Deep Tendon Reflexes: Reflexes are normal and symmetric.   Psychiatric:         Behavior: Behavior normal.         Thought Content: Thought content normal.         Judgment: Judgment normal.         The 10-year CVD risk score (BLANQUITA'Agostino, et al., 2008) is: 8.5%    Values used to calculate the score:      Age: 63 years      Sex: Female      Diabetic: No      Tobacco smoker: No      Systolic Blood Pressure: 123 mmHg      Is BP treated: Yes      HDL Cholesterol: 71 mg/dL      Total Cholesterol: 221 mg/dL    Lab Results   Component Value Date    CHLPL 221 (H) 11/05/2021    TRIG 104 11/05/2021    HDL 71 (H) 11/05/2021     (H) 11/05/2021     Glucose   Date Value Ref Range Status   11/05/2021 93 65 - 99 mg/dL Final     BUN   Date Value Ref Range Status   11/05/2021 12 8 - 23 mg/dL Final     Creatinine   Date Value Ref Range Status   11/05/2021 0.77 0.57 - 1.00 mg/dL Final     Sodium   Date Value Ref Range Status   11/05/2021 138 136 - 145 mmol/L Final     Potassium   Date Value Ref Range Status   11/05/2021 4.6 3.5 - 5.2 mmol/L Final     Chloride   Date Value Ref Range Status   11/05/2021 101 98 - 107 mmol/L Final     Total CO2   Date Value Ref Range Status   11/05/2021 32.3 (H) 22.0 - 29.0 mmol/L Final     Calcium   Date Value Ref Range Status   11/05/2021 10.1 8.6 - 10.5 mg/dL Final     Total Protein   Date Value Ref Range Status   03/28/2016 7.7 5.7 - 8.2 g/dL Final      Albumin   Date Value Ref Range Status   11/05/2021 4.60 3.50 - 5.20 g/dL Final     ALT (SGPT)   Date Value Ref Range Status   11/05/2021 11 1 - 33 U/L Final     AST (SGOT)   Date Value Ref Range Status   11/05/2021 22 1 - 32 U/L Final     Alkaline Phosphatase   Date Value Ref Range Status   11/05/2021 86 39 - 117 U/L Final     Total Bilirubin   Date Value Ref Range Status   11/05/2021 0.2 0.0 - 1.2 mg/dL Final     eGFR Non  Am   Date Value Ref Range Status   11/05/2021 76 >60 mL/min/1.73 Final     Comment:     GFR Normal >60  Chronic Kidney Disease <60  Kidney Failure <15       A/G Ratio   Date Value Ref Range Status   11/05/2021 1.7 g/dL Final     BUN/Creatinine Ratio   Date Value Ref Range Status   11/05/2021 15.6 7.0 - 25.0 Final     Anion Gap   Date Value Ref Range Status   03/28/2016 4 3 - 11 mmol/L Final     Lab Results   Component Value Date    WBC 6.95 12/17/2020    HGB 13.8 12/17/2020    HCT 39.7 12/17/2020    MCV 89.2 12/17/2020     12/17/2020        Assessment & Plan   1. Healthy female exam.   2. Patient Counseling: Including but not Limited to the following, when appropriate:  --Nutrition: Stressed importance of moderation in sodium/caffeine intake, saturated fat and cholesterol, caloric balance, sufficient intake of fresh fruits, vegetables, fiber, calcium, iron, -- replacement, calcium supplement, and the daily use of baby aspirin.  --Exercise: Stressed the importance of regular exercise.   --Substance Abuse: Discussed cessation/primary prevention of tobacco, alcohol, or other drug use; driving or other dangerous activities under the influence; availability of treatment for abuse, as indicated based on social history.    --Sexuality: Discussed sexually transmitted diseases, partner selection, use of condoms, avoidance of unintended pregnancy  and contraceptive alternatives.   --Injury prevention: Discussed safety belts, safety helmets, smoke detector, smoking near bedding or  gil.   --Dental health: Discussed importance of regular tooth brushing, flossing, and dental visits.  --Immunizations reviewed.  --Discussed benefits of colon cancer screening.      3. Discussed the patient's BMI with her.  BMI is within normal parameters. No other follow-up for BMI required.   The BMI is in the acceptable range  4. No follow-ups on file.  5. Age-appropriate Screening Scheduled  6. There are no Patient Instructions on file for this visit.    Assessment & Plan     Diagnoses and all orders for this visit:    1. HTN (hypertension), benign (Primary) stable with current meds    2. Routine general medical examination at a health care facility  -     Comprehensive Metabolic Panel  -     Hemoglobin A1c  -     Lipid Panel    Other orders  -     amLODIPine (NORVASC) 10 MG tablet; Take 1 tablet by mouth Daily.  Dispense: 90 tablet; Refill: 3  -     hydroCHLOROthiazide (HYDRODIURIL) 12.5 MG tablet; Take 1 tablet by mouth Daily.  Dispense: 90 tablet; Refill: 3  -     lisinopril (PRINIVIL,ZESTRIL) 40 MG tablet; Take 1 tablet by mouth Daily.  Dispense: 90 tablet; Refill: 3

## 2023-11-10 LAB
ALBUMIN SERPL-MCNC: 4.7 G/DL (ref 3.5–5.2)
ALBUMIN/GLOB SERPL: 2.1 G/DL
ALP SERPL-CCNC: 82 U/L (ref 39–117)
ALT SERPL-CCNC: 11 U/L (ref 1–33)
AST SERPL-CCNC: 20 U/L (ref 1–32)
BILIRUB SERPL-MCNC: 0.4 MG/DL (ref 0–1.2)
BUN SERPL-MCNC: 11 MG/DL (ref 8–23)
BUN/CREAT SERPL: 14.3 (ref 7–25)
CALCIUM SERPL-MCNC: 9.9 MG/DL (ref 8.6–10.5)
CHLORIDE SERPL-SCNC: 103 MMOL/L (ref 98–107)
CHOLEST SERPL-MCNC: 212 MG/DL (ref 0–200)
CO2 SERPL-SCNC: 28.9 MMOL/L (ref 22–29)
CREAT SERPL-MCNC: 0.77 MG/DL (ref 0.57–1)
EGFRCR SERPLBLD CKD-EPI 2021: 86.8 ML/MIN/1.73
GLOBULIN SER CALC-MCNC: 2.2 GM/DL
GLUCOSE SERPL-MCNC: 89 MG/DL (ref 65–99)
HBA1C MFR BLD: 5.9 % (ref 4.8–5.6)
HDLC SERPL-MCNC: 65 MG/DL (ref 40–60)
LDLC SERPL CALC-MCNC: 134 MG/DL (ref 0–100)
POTASSIUM SERPL-SCNC: 4.4 MMOL/L (ref 3.5–5.2)
PROT SERPL-MCNC: 6.9 G/DL (ref 6–8.5)
SODIUM SERPL-SCNC: 139 MMOL/L (ref 136–145)
TRIGL SERPL-MCNC: 75 MG/DL (ref 0–150)
VLDLC SERPL CALC-MCNC: 13 MG/DL (ref 5–40)

## 2024-06-10 ENCOUNTER — TRANSCRIBE ORDERS (OUTPATIENT)
Dept: ADMINISTRATIVE | Facility: HOSPITAL | Age: 64
End: 2024-06-10
Payer: COMMERCIAL

## 2024-06-10 DIAGNOSIS — Z12.31 SCREENING MAMMOGRAM FOR BREAST CANCER: Primary | ICD-10-CM

## 2024-09-20 ENCOUNTER — HOSPITAL ENCOUNTER (OUTPATIENT)
Dept: MAMMOGRAPHY | Facility: HOSPITAL | Age: 64
Discharge: HOME | End: 2024-09-20
Admitting: INTERNAL MEDICINE
Payer: COMMERCIAL

## 2024-09-20 DIAGNOSIS — Z12.31 SCREENING MAMMOGRAM FOR BREAST CANCER: ICD-10-CM

## 2024-09-20 PROCEDURE — 77067 SCR MAMMO BI INCL CAD: CPT

## 2024-09-20 PROCEDURE — 77063 BREAST TOMOSYNTHESIS BI: CPT

## 2024-11-11 RX ORDER — LISINOPRIL 40 MG/1
40 TABLET ORAL DAILY
Qty: 90 TABLET | Refills: 3 | Status: SHIPPED | OUTPATIENT
Start: 2024-11-11 | End: 2024-11-18

## 2024-11-11 RX ORDER — HYDROCHLOROTHIAZIDE 12.5 MG/1
12.5 TABLET ORAL DAILY
Qty: 90 TABLET | Refills: 3 | Status: SHIPPED | OUTPATIENT
Start: 2024-11-11 | End: 2024-11-18

## 2024-11-11 RX ORDER — AMLODIPINE BESYLATE 10 MG/1
10 TABLET ORAL DAILY
Qty: 90 TABLET | Refills: 3 | Status: SHIPPED | OUTPATIENT
Start: 2024-11-11 | End: 2024-11-18

## 2024-11-18 ENCOUNTER — OFFICE VISIT (OUTPATIENT)
Dept: INTERNAL MEDICINE | Facility: CLINIC | Age: 64
End: 2024-11-18
Payer: COMMERCIAL

## 2024-11-18 VITALS
HEART RATE: 62 BPM | OXYGEN SATURATION: 98 % | RESPIRATION RATE: 16 BRPM | SYSTOLIC BLOOD PRESSURE: 122 MMHG | TEMPERATURE: 97.6 F | BODY MASS INDEX: 24.8 KG/M2 | HEIGHT: 63 IN | WEIGHT: 140 LBS | DIASTOLIC BLOOD PRESSURE: 62 MMHG

## 2024-11-18 DIAGNOSIS — Z00.00 ROUTINE GENERAL MEDICAL EXAMINATION AT A HEALTH CARE FACILITY: Primary | ICD-10-CM

## 2024-11-18 PROCEDURE — 99396 PREV VISIT EST AGE 40-64: CPT | Performed by: INTERNAL MEDICINE

## 2024-11-18 RX ORDER — LISINOPRIL 40 MG/1
40 TABLET ORAL DAILY
Qty: 90 TABLET | Refills: 3 | Status: SHIPPED | OUTPATIENT
Start: 2024-11-18

## 2024-11-18 RX ORDER — HYDROCHLOROTHIAZIDE 12.5 MG/1
12.5 TABLET ORAL DAILY
Qty: 90 TABLET | Refills: 3 | Status: SHIPPED | OUTPATIENT
Start: 2024-11-18

## 2024-11-18 RX ORDER — AMLODIPINE BESYLATE 10 MG/1
10 TABLET ORAL DAILY
Qty: 90 TABLET | Refills: 3 | Status: SHIPPED | OUTPATIENT
Start: 2024-11-18

## 2024-11-18 NOTE — PROGRESS NOTES
Chief Complaint   Patient presents with    Annual Exam       Evelyne Cantu is a 64 y.o. female and is here for a comprehensive physical exam. The patient reports problems - htn .     History:  LMP: No LMP recorded (lmp unknown). Patient has had a hysterectomy.  Menopause at 48 years  Last pap date:   Abnormal pap? no  : 2  Para: 2    Do you take any herbs or supplements that were not prescribed by a doctor? no  Are you taking calcium supplements? no  Are you taking aspirin daily? no      Health Habits:  Dental Exam. up to date  Eye Exam. up to date  Exercise: 5 times/week.  Current exercise activities include: walking and yoga    Health Maintenance   Topic Date Due    ANNUAL PHYSICAL  2024    MAMMOGRAM  2026    TDAP/TD VACCINES (2 - Td or Tdap) 2031    HEPATITIS C SCREENING  Completed    COVID-19 Vaccine  Completed    INFLUENZA VACCINE  Completed    ZOSTER VACCINE  Completed    Pneumococcal Vaccine 0-64  Aged Out    PAP SMEAR  Discontinued    COLORECTAL CANCER SCREENING  Discontinued       PMH, PSH, SocHx, FamHx, Allergies, and Medications: Reviewed and updated in the Visit Navigator.     No Known Allergies  Past Medical History:   Diagnosis Date    Allergic     Arthritis     Hypertension     Vitamin D deficiency      Past Surgical History:   Procedure Laterality Date    ANKLE SURGERY      BREAST BIOPSY      FRACTURE SURGERY  2004    HYSTERECTOMY       Social History     Socioeconomic History    Marital status:    Tobacco Use    Smoking status: Never    Smokeless tobacco: Never   Vaping Use    Vaping status: Never Used   Substance and Sexual Activity    Alcohol use: Yes     Alcohol/week: 1.0 standard drink of alcohol     Types: 1 Glasses of wine per week    Drug use: No    Sexual activity: Yes     Partners: Male     Birth control/protection: Post-menopausal     Family History   Problem Relation Age of Onset    Arthritis Mother     Cancer Mother     Hypertension  "Mother     Hypertension Father     Hypertension Brother     Breast cancer Maternal Aunt     Breast cancer Paternal Aunt        Review of Systems  Review of Systems   Constitutional: Negative.  Negative for activity change, appetite change, fatigue and fever.   HENT:  Negative for congestion, ear discharge, ear pain and trouble swallowing.    Eyes:  Negative for photophobia and visual disturbance.   Respiratory:  Negative for cough and shortness of breath.    Cardiovascular:  Negative for chest pain and palpitations.   Gastrointestinal:  Negative for abdominal distention, abdominal pain, constipation, diarrhea, nausea and vomiting.   Endocrine: Negative.    Genitourinary:  Negative for dysuria, hematuria and urgency.   Musculoskeletal:  Positive for arthralgias. Negative for back pain, joint swelling and myalgias.   Skin:  Negative for color change and rash.   Allergic/Immunologic: Negative.    Neurological:  Negative for dizziness, weakness, light-headedness and headaches.   Hematological:  Negative for adenopathy. Does not bruise/bleed easily.   Psychiatric/Behavioral:  Negative for agitation, confusion and dysphoric mood. The patient is not nervous/anxious.        Vitals:    11/18/24 1554   BP: 122/62   Pulse: 62   Resp: 16   Temp: 97.6 °F (36.4 °C)   SpO2: 98%       Objective   /62   Pulse 62   Temp 97.6 °F (36.4 °C)   Resp 16   Ht 160 cm (63\")   Wt 63.5 kg (140 lb)   LMP  (LMP Unknown)   SpO2 98%   BMI 24.80 kg/m²     Physical Exam  Constitutional:       General: She is not in acute distress.     Appearance: She is well-developed.   HENT:      Nose: Nose normal.   Eyes:      General: No scleral icterus.     Conjunctiva/sclera: Conjunctivae normal.   Neck:      Thyroid: No thyromegaly.      Trachea: No tracheal deviation.   Cardiovascular:      Rate and Rhythm: Normal rate and regular rhythm.      Heart sounds: No murmur heard.     No friction rub.   Pulmonary:      Effort: No respiratory distress. "      Breath sounds: No wheezing or rales.   Abdominal:      General: There is no distension.      Palpations: Abdomen is soft. There is no mass.      Tenderness: There is no abdominal tenderness. There is no guarding.   Musculoskeletal:         General: Deformity present. Normal range of motion.   Lymphadenopathy:      Cervical: No cervical adenopathy.   Skin:     General: Skin is warm and dry.      Findings: No erythema or rash.   Neurological:      Mental Status: She is alert and oriented to person, place, and time.      Cranial Nerves: No cranial nerve deficit.      Coordination: Coordination normal.      Deep Tendon Reflexes: Reflexes are normal and symmetric.   Psychiatric:         Behavior: Behavior normal.         Thought Content: Thought content normal.         Judgment: Judgment normal.       The 10-year CVD risk score (D'Agostino, et al., 2008) is: 8.7%    Values used to calculate the score:      Age: 64 years      Sex: Female      Diabetic: No      Tobacco smoker: No      Systolic Blood Pressure: 122 mmHg      Is BP treated: Yes      HDL Cholesterol: 65 mg/dL      Total Cholesterol: 212 mg/dL    Lab Results   Component Value Date    CHLPL 212 (H) 11/10/2023    TRIG 75 11/10/2023    HDL 65 (H) 11/10/2023     (H) 11/10/2023     Glucose   Date Value Ref Range Status   11/10/2023 89 65 - 99 mg/dL Final     BUN   Date Value Ref Range Status   11/10/2023 11 8 - 23 mg/dL Final     Creatinine   Date Value Ref Range Status   11/10/2023 0.77 0.57 - 1.00 mg/dL Final     Sodium   Date Value Ref Range Status   11/10/2023 139 136 - 145 mmol/L Final     Potassium   Date Value Ref Range Status   11/10/2023 4.4 3.5 - 5.2 mmol/L Final     Chloride   Date Value Ref Range Status   11/10/2023 103 98 - 107 mmol/L Final     Total CO2   Date Value Ref Range Status   11/10/2023 28.9 22.0 - 29.0 mmol/L Final     Calcium   Date Value Ref Range Status   11/10/2023 9.9 8.6 - 10.5 mg/dL Final     Total Protein   Date Value Ref  Range Status   03/28/2016 7.7 5.7 - 8.2 g/dL Final     Albumin   Date Value Ref Range Status   11/10/2023 4.7 3.5 - 5.2 g/dL Final     ALT (SGPT)   Date Value Ref Range Status   11/10/2023 11 1 - 33 U/L Final     AST (SGOT)   Date Value Ref Range Status   11/10/2023 20 1 - 32 U/L Final     Alkaline Phosphatase   Date Value Ref Range Status   11/10/2023 82 39 - 117 U/L Final     Total Bilirubin   Date Value Ref Range Status   11/10/2023 0.4 0.0 - 1.2 mg/dL Final     eGFR Non  Am   Date Value Ref Range Status   11/05/2021 76 >60 mL/min/1.73 Final     Comment:     GFR Normal >60  Chronic Kidney Disease <60  Kidney Failure <15       A/G Ratio   Date Value Ref Range Status   11/10/2023 2.1 g/dL Final     BUN/Creatinine Ratio   Date Value Ref Range Status   11/10/2023 14.3 7.0 - 25.0 Final     Anion Gap   Date Value Ref Range Status   03/28/2016 4 3 - 11 mmol/L Final     Lab Results   Component Value Date    WBC 6.95 12/17/2020    HGB 13.8 12/17/2020    HCT 39.7 12/17/2020    MCV 89.2 12/17/2020     12/17/2020        Assessment & Plan   1. Healthy female exam.   2. Patient Counseling: Including but not Limited to the following, when appropriate:  --Nutrition: Stressed importance of moderation in sodium/caffeine intake, saturated fat and cholesterol, caloric balance, sufficient intake of fresh fruits, vegetables, fiber, calcium, iron, and--Discussed the issue of estrogen replacement, calcium supplement, and the daily use of baby aspirin.  --Exercise: Stressed the importance of regular exercise.   --Substance Abuse: Discussed cessation/primary prevention of tobacco, alcohol, or other drug use; driving or other dangerous activities under the influence; availability of treatment for abuse, as indicated based on social history.    --Sexuality: Discussed sexually transmitted diseases, partner selection, use of condoms, avoidance of unintended pregnancy  and contraceptive alternatives.   --Injury prevention:  Discussed safety belts, safety helmets, smoke detector, smoking near bedding or upholstery.   --Dental health: Discussed importance of regular tooth brushing, flossing, and dental visits.  --Immunizations reviewed.  --Discussed benefits of colon cancer screening.      3. Discussed the patient's BMI with her.  BMI is within normal parameters. No other follow-up for BMI required.   The BMI is above average; BMI management plan is completed  4. No follow-ups on file.  5. Age-appropriate Screening Scheduled  6. There are no Patient Instructions on file for this visit.    Assessment & Plan     Diagnoses and all orders for this visit:    1. Routine general medical examination at a health care facility (Primary)    Other orders  -     amLODIPine (NORVASC) 10 MG tablet; Take 1 tablet by mouth Daily.  Dispense: 90 tablet; Refill: 3  -     hydroCHLOROthiazide 12.5 MG tablet; Take 1 tablet by mouth Daily.  Dispense: 90 tablet; Refill: 3  -     lisinopril (PRINIVIL,ZESTRIL) 40 MG tablet; Take 1 tablet by mouth Daily.  Dispense: 90 tablet; Refill: 3

## 2024-12-06 LAB
ALBUMIN SERPL-MCNC: 4.5 G/DL (ref 3.5–5.2)
ALBUMIN/GLOB SERPL: 1.8 G/DL
ALP SERPL-CCNC: 85 U/L (ref 39–117)
ALT SERPL-CCNC: 12 U/L (ref 1–33)
AST SERPL-CCNC: 22 U/L (ref 1–32)
BILIRUB SERPL-MCNC: 0.4 MG/DL (ref 0–1.2)
BUN SERPL-MCNC: 16 MG/DL (ref 8–23)
BUN/CREAT SERPL: 20 (ref 7–25)
CALCIUM SERPL-MCNC: 9.7 MG/DL (ref 8.6–10.5)
CHLORIDE SERPL-SCNC: 101 MMOL/L (ref 98–107)
CHOLEST SERPL-MCNC: 253 MG/DL (ref 0–200)
CO2 SERPL-SCNC: 29.6 MMOL/L (ref 22–29)
CREAT SERPL-MCNC: 0.8 MG/DL (ref 0.57–1)
EGFRCR SERPLBLD CKD-EPI 2021: 82.4 ML/MIN/1.73
GLOBULIN SER CALC-MCNC: 2.5 GM/DL
GLUCOSE SERPL-MCNC: 96 MG/DL (ref 65–99)
HBA1C MFR BLD: 5.8 % (ref 4.8–5.6)
HDLC SERPL-MCNC: 73 MG/DL (ref 40–60)
LDLC SERPL CALC-MCNC: 165 MG/DL (ref 0–100)
POTASSIUM SERPL-SCNC: 4.6 MMOL/L (ref 3.5–5.2)
PROT SERPL-MCNC: 7 G/DL (ref 6–8.5)
SODIUM SERPL-SCNC: 138 MMOL/L (ref 136–145)
TRIGL SERPL-MCNC: 89 MG/DL (ref 0–150)
VLDLC SERPL CALC-MCNC: 15 MG/DL (ref 5–40)

## 2025-04-11 RX ORDER — HYDROCHLOROTHIAZIDE 12.5 MG/1
12.5 TABLET ORAL DAILY
Qty: 90 TABLET | Refills: 3 | Status: SHIPPED | OUTPATIENT
Start: 2025-04-11

## 2025-04-11 RX ORDER — LISINOPRIL 40 MG/1
40 TABLET ORAL DAILY
Qty: 90 TABLET | Refills: 3 | Status: SHIPPED | OUTPATIENT
Start: 2025-04-11

## 2025-04-11 RX ORDER — AMLODIPINE BESYLATE 10 MG/1
10 TABLET ORAL DAILY
Qty: 90 TABLET | Refills: 3 | Status: SHIPPED | OUTPATIENT
Start: 2025-04-11

## 2025-07-08 ENCOUNTER — TRANSCRIBE ORDERS (OUTPATIENT)
Dept: ADMINISTRATIVE | Facility: HOSPITAL | Age: 65
End: 2025-07-08
Payer: MEDICARE

## 2025-07-08 DIAGNOSIS — Z12.31 VISIT FOR SCREENING MAMMOGRAM: Primary | ICD-10-CM
